# Patient Record
Sex: FEMALE | Race: WHITE | NOT HISPANIC OR LATINO | Employment: OTHER | RURAL
[De-identification: names, ages, dates, MRNs, and addresses within clinical notes are randomized per-mention and may not be internally consistent; named-entity substitution may affect disease eponyms.]

---

## 2019-09-06 ENCOUNTER — OFFICE VISIT CONVERTED (OUTPATIENT)
Dept: PULMONOLOGY | Facility: CLINIC | Age: 69
End: 2019-09-06
Attending: INTERNAL MEDICINE

## 2019-10-04 ENCOUNTER — OFFICE VISIT CONVERTED (OUTPATIENT)
Dept: PULMONOLOGY | Facility: CLINIC | Age: 69
End: 2019-10-04
Attending: INTERNAL MEDICINE

## 2020-02-21 ENCOUNTER — OFFICE VISIT CONVERTED (OUTPATIENT)
Dept: PULMONOLOGY | Facility: CLINIC | Age: 70
End: 2020-02-21
Attending: INTERNAL MEDICINE

## 2020-09-11 ENCOUNTER — OFFICE VISIT CONVERTED (OUTPATIENT)
Dept: PULMONOLOGY | Facility: CLINIC | Age: 70
End: 2020-09-11
Attending: INTERNAL MEDICINE

## 2021-05-28 VITALS
RESPIRATION RATE: 18 BRPM | SYSTOLIC BLOOD PRESSURE: 139 MMHG | HEIGHT: 65 IN | HEIGHT: 65 IN | DIASTOLIC BLOOD PRESSURE: 45 MMHG | WEIGHT: 234.37 LBS | HEIGHT: 65 IN | HEART RATE: 53 BPM | SYSTOLIC BLOOD PRESSURE: 142 MMHG | OXYGEN SATURATION: 95 % | BODY MASS INDEX: 39.4 KG/M2 | DIASTOLIC BLOOD PRESSURE: 74 MMHG | HEART RATE: 59 BPM | BODY MASS INDEX: 34.16 KG/M2 | WEIGHT: 205 LBS | RESPIRATION RATE: 16 BRPM | BODY MASS INDEX: 39.05 KG/M2 | OXYGEN SATURATION: 97 % | DIASTOLIC BLOOD PRESSURE: 52 MMHG | HEART RATE: 59 BPM | TEMPERATURE: 98.2 F | OXYGEN SATURATION: 92 % | TEMPERATURE: 98.1 F | RESPIRATION RATE: 16 BRPM | WEIGHT: 236.5 LBS | SYSTOLIC BLOOD PRESSURE: 126 MMHG

## 2021-05-28 VITALS
WEIGHT: 209.37 LBS | DIASTOLIC BLOOD PRESSURE: 46 MMHG | OXYGEN SATURATION: 93 % | BODY MASS INDEX: 38.53 KG/M2 | TEMPERATURE: 97.4 F | HEART RATE: 52 BPM | HEIGHT: 62 IN | RESPIRATION RATE: 14 BRPM | SYSTOLIC BLOOD PRESSURE: 146 MMHG

## 2021-05-28 NOTE — PROGRESS NOTES
Patient: LOUIE MORENO     Acct: [id number]     Report: #[id number]  UNIT #: [id number]     : 1950    Encounter Date:2020  PRIMARY CARE: TATIANA BATES  ***Signed***  --------------------------------------------------------------------------------------------------------------------  Chief Complaint      Encounter Date      Sep 11, 2020            Primary Care Provider      TATIANA BATES            Referring Provider            [facility]            Patient Complaint      Patient is complaining of      Pt here for f/u, pneumonia            VITALS      Height 5 ft 2 in / 157.48 cm      Weight 209 lbs 6 oz / 94.967940 kg      BSA 1.95 m2      BMI 38.3 kg/m2      Temperature 97.4 F / 36.33 C - Temporal      Pulse 52      Respirations 14      Blood Pressure 146/46 Sitting, Left Arm      Pulse Oximetry 93%, ROOM AIR      Initial Exhaled Nitrous Oxide      Date:  Sep 6, 2019            HPI      The patient is a 70 year old  mentally challenged adult female who lives in an     assisted living facility for mentally handicapped people. She wears supplemental    oxygen 2 liters at night. She used to be on a BiPAP machine but was not able to     cooperate with it secondary to her underlying disorder with her mental capacity     and handicap. She has been doing quite well and actually lost quite a bit of     weight over the past 6 months secondary to dietary changes and smaller portions.    She also is sleeping better now that she has stopped Seroquel, she was having     quite a bit of adverse reactions to that medication now that she has been off of    it she thinks a lot has improved and it has also contributed to her weight loss.    She has no complaints today and is here for routine follow up. She has had no     need for hospitalization and has not been hospitalized for pneumonia in the past    6 months, no antibiotics or steroids administered by her primary care provider.     She does take Brovana  and Pulmicort twice daily and DuoNeb approximately twice     daily to three times a day.            ROS      Constitutional:  Denies: Fatigue, Fever, Weight gain, Weight loss, Chills,     Insomnia, Other      Endocrine:  Denies: Polydipsia, Polyuria, Heat/cold intolerance, Abnorml     menstrual pattern, Diabetes, Other      Eyes:  Denies: Blurred vision, Vision Changes, Other      Ears, nose, mouth, throat:  Denies: Mouth lesions, Thrush, Throat pain,     Hoarseness, Allergies/Hay Fever, Post Nasal Drip, Headaches, Recent Head Injury,    Nose Bleeding, Neck Stiffness, Thyroid Mass, Hearing Loss, Ear Fullness, Dry     Mouth, Nasal or Sinus Pain, Dry Lips, Nasal discharge, Nasal congestion, Other      Cardiovascular:  Denies: Palpitations, Syncope, Claudication, Chest Pain, Wake     up Gasping for air, Leg Swelling, Irregular Heart Rate, Cyanosis, Dyspnea on     Exertion, Other      Gastrointestinal:  Denies: Nausea, Constipation, Diarrhea, Abdominal pain,     Vomiting, Difficulty Swallowing, Reflux/Heartburn, Dysphagia, Jaundice,     Bloating, Melena, Bloody stools, Other      Genitourinary:  Denies: Urinary frequency, Incontinence, Hematuria, Urgency,     Nocturia, Dysuria, Testicular problems, Other      Musculoskeletal:  Denies: Joint Pain, Joint Stiffness, Joint Swelling, Myalgias,    Other      Hematologic/lymphatic:  DENIES: Lymphadenopathy, Bruising, Bleeding tendencies,     Other      Neurological:  Denies: Headache, Numbness, Weakness, Seizures, Other      Psychiatric:  Denies: Anxiety, Appropriate Effect, Depression, Other      Sleep:  No: Excessive daytime sleep, Morning Headache?, Snoring, Insomnia?, Stop    breathing at sleep?, Other      Integumentary:  Denies: Rash, Dry skin, Skin Warm to Touch, Other      Immunologic/Allergic:  Denies: Latex allergy, Seasonal allergies, Asthma,     Urticaria, Eczema, Other      Immunization status:  No: Up to date            FAMILY/SOCIAL/MEDICAL HX      Surgical  History:  No: AAA Repair, Abdominal Surgery, Adenoids, Angioplasty,     Appendectomy, Back Surgery, Bladder Surgery, Bowel Surgery, Breast Surgery,     CABG, Carotid Stenosis, Cholecystectomy, Ear Surgery, Eye Surgery, Head Surgery,    Hernia Surgery, Kidney Surgery, Nose Surgery, Oral Surgery, Orthopedic Surgery,     Prostatectomy, Rectal Surgery, Spinal Surgery, Testicular Surgery, Throat Surge    ry, Tonsils, Valve Replacement, Vascular Surgery, Other Surgeries      Heart - Family Hx:  Mother      Diabetes - Family Hx:  Father, Brother, Sister      Cancer/Type - Family Hx:  Brother, Sister      Is Father Still Living?:  No      Is Mother Still Living?:  No      Social History:  No Tobacco Use, No Alcohol Use, No Recreational Drug use      Smoking status:  Never smoker      Hysterectomy:  Yes (Partial)      Anticoagulation Therapy:  No      Antibiotic Prophylaxis:  No      Medical History:  Yes: Anemia, Arthritis, Depression, Anxiety, GERD, High Blood     Pressure, Kidney or Bladder Disease, Reflux Disease, Thyroid Problem; No:     Chemotherapy/Cancer, Sinus Trouble      Psychiatric History      Anxiety and depression            PREVENTION      Hx Influenza Vaccination:  Yes      Date Influenza Vaccine Given:  Oct 1, 2008      Influenza Vaccine Declined:  Yes      2 or More Falls in Past Year?:  No      Fall Past Year with Injury?:  No      Hx Pneumococcal Vaccination:  No      Encouraged to follow-up with:  PCP regarding preventative exams.      Chart initiated by      Suzie pena MA            ALLERGIES/MEDICATIONS      Allergies:        Coded Allergies:             BUSPIRONE (Verified  Allergy, Intermediate, 9/11/20)           INFLUENZA VIRUS VACCINES (Verified  Allergy, Intermediate, 9/11/20)           OLANZAPINE (Verified  Allergy, Intermediate, 9/11/20)           PAROXETINE (Verified  Allergy, Intermediate, 9/11/20)           PHENTERMINE (Verified  Allergy, Intermediate, 9/11/20)           ZIPRASIDONE  (Verified  Allergy, Intermediate, 9/11/20)      Medications    Last Reconciled on 2/21/20 14:07 by CHELSIE DEAL,       Arformoterol Tartrate (Brovana) 15 Mcg/2 Ml Vial.neb      15 MCG INH RTQ12H, #60 NEB 4 Refills         Prov: CHELSIE DEAL         6/30/20       Neb-Budesonide (Pulmicort) 0.5 Mg/2 Ml Ampul.neb      0.5 MG INH BID, #60 NEB 3 Refills         Prov: CHELSIE DEAL         6/25/20       Nebulizer Accessories (Nebulizer Mask Adult) 1 Each Each      EACH XX ONCE, #1 0 Refills         Prov: CHELSIE DEAL         9/6/19       MUPIROCIN Oint (Bactroban 2% Oint) 22 Gm Oint...g.      1 APL TOPICAL TID, #1 TUBE         Reported         9/6/19       Zinc Oxide 20% Ointment (Zinc Oxide 20% Oint*) 28.35 Gm Oint...g.      1 APL TOPICAL BID, #1 TUBE         Reported         9/6/19       traZODone HCl (Desyrel) 50 Mg Tablet      100 MG PO HS, #60 TAB 0 Refills         Reported         9/6/19       Sertraline HCl (Sertraline*) 100 Mg Tablet      100 MG PO QDAY, #30 TAB 0 Refills         Reported         9/6/19       QUEtiapine (QUEtiapine) 200 Mg Tablet      400 MG PO QDAY for 30 Days, #60 TAB         Reported         9/6/19       Pantoprazole (Protonix) 40 Mg Tablet.dr      40 MG PO HS, #30 TAB 0 Refills         Reported         9/6/19       guaiFENesin LA (Mucinex) 600 Mg Tab.er.12h      600 MG PO BID, TAB         Reported         9/6/19       Metoprolol Tartrate (Metoprolol Tartrate) 25 Mg Tablet      25 MG PO BID, #60 TAB 0 Refills         Reported         9/6/19       Memantine Xr (Namenda XR) 28 Mg Cap.spr.24      28 MG PO QDAY, #30 CAP 0 Refills         Reported         9/6/19       Levothyroxine (Synthroid) 0.175 Mg      0.175 MG PO QDAY@07, #30 TAB 0 Refills         Reported         9/6/19       lamoTRIgine (lamoTRIgine) 25 Mg Tablet      25 MG PO BID, TAB         Reported         9/6/19       hydrOXYzine HCL (Atarax) 25 Mg Tablet      25 MG PO TID, #90 TAB 0 Refills         Reported         9/6/19        hydrALAZINE HCL (hydrALAZINE HCL) 25 Mg Tablet      25 MG PO BID, #60 TAB 0 Refills         Reported         9/6/19       Gabapentin (Gabapentin) 250 Mg/5 Ml Solution      300 MG PO HS, #180 ML 0 Refills         Reported         9/6/19       Richar-Fluticasone (Fluticasone 50 mcg) 16 Gm Spray.susp      2 PUFFS NARE EACH QDAY, #1 BOTTLE 0 Refills         Reported         9/6/19       Ferrous Sulfate (Ferrous Sulfate*) 325 Mg Tablet      325 MG PO QDAY, #30 TAB 0 Refills         Reported         9/6/19       Donepezil Hcl (Donepezil*) 10 Mg Tablet      10 MG PO HS, TAB         Reported         9/6/19       Docusate Sodium (Docusate Sodium) 50 Mg/5 Ml Liquid      100 MG PO HS, BOTTLE         Reported         9/6/19       Cyanocobalamin (Vitamin B-12) Inj (Cyanocobalamin Inj) 1,000 Mcg/1 Ml Vial      1000 MCG IM Q30D, VIAL         Reported         9/6/19       amLODIPine (amLODIPine) 5 Mg Tablet      10 MG PO QDAY, #60 TAB         Reported         9/6/19      Current Medications      Current Medications Reviewed 9/11/20            EXAM      VITAL SIGNS:  Reviewed.        NECK:  Supple without tracheal deviation or lymphadenopathy.  No thyromegaly     appreciated.      LYMPHATICS:  No cervical or supraclavicular lymphadenopathy.      HEENT: Pupils are equal, round and reactive to light. There is no scleral     icterus.  Nares patent without hypertrophy of the turbinates. No erythema of the    passages.  TMs are clear bilaterally with good cone of light. The posterior     pharynx is without  lesions or erythema.      RESPIRATORY: Poor aeration. Clear to auscultation bilaterally.  No wheezes,     rales or rhonchi.  Tympanic to percussion.        CARDIOVASCULAR:  Regular rate and rhythm.  No murmurs, gallops or rubs.  No     lower extremity edema.  Equal radial pulses.        GI: Soft, nontender, nondistended, no organomegaly.  Bowel sounds present in all    four quadrants. Obese.       MUSCULOSKELETAL:  No joint effusions,  erythema or warmth over the major joint     systems.      SKIN:  No rashes or lesions.      NEUROLOGIC: Cranial nerves II-XII are intact bilaterally.  Moves all     extremities. Ambulates with ease.      PSYCH:  Appropriate mood and affect.      Vtials      Vitals:             Height 5 ft 2 in / 157.48 cm           Weight 209 lbs 6 oz / 94.526828 kg           BSA 1.95 m2           BMI 38.3 kg/m2           Temperature 97.4 F / 36.33 C - Temporal           Pulse 52           Respirations 14           Blood Pressure 146/46 Sitting, Left Arm           Pulse Oximetry 93%, ROOM AIR            Assessment      Notes      Discontinued Medications      * NEB-Levalbuterol (LEVALBUTEROL HCL) 1.25 MG/3 ML VIAL.NEB: 1.25 MG INH RTTID       PRN SHORTNESS OF BREATH/WHEEZING #90         Instructions: DIAGNOSIS CODE REQUIRED PRIOR TO PRESCRIBING.      ASSESSMENT:      1.Chronic hypoxemic respiratory failure.       2. History of recurrent pneumonia secondary to aspiration into the airways.       3. Dysphagia on a mechanical soft and nectar thickened liquid diet.       4. Developmental delay with mental retardation and dementia.       5. Obesity with BMI 38.3.            PLAN:      1. I have continued to encourage ongoing weight loss and dietary changes.       2. I have made sure she has plenty of nebulizer refills on hand       3. Continue with supplemental oxygen at night.      4. Follow up in 1 year sooner if needed.            Patient Education      ACO BMI High above 25:  Counseling Given, Encouraged weight loss, Encourage     dietary changes      Education resources provided:  Yes            Electronically signed by CHELSIE DEAL  09/23/2020 17:08       Disclaimer: Converted document may not contain table formatting or lab diagrams. Please see [employer] for the authenticated document.

## 2021-05-28 NOTE — PROGRESS NOTES
Patient: LOUIE MORENO     Acct: [id number]     Report: #[id number]  UNIT #: [id number]     : 1950    Encounter Date:10/04/2019  PRIMARY CARE: TATIANA BATES  ***Signed***  --------------------------------------------------------------------------------------------------------------------  Chief Complaint      Encounter Date      Oct 4, 2019            Primary Care Provider      TATIANA BATES            Referring Provider            [facility]            Patient Complaint      Patient is complaining of      f/u, acute respiratory failure            VITALS      Height 5 ft 5 in / 165.1 cm      Weight 234 lbs 6 oz / 106.488257 kg      BSA 2.12 m2      BMI 39.0 kg/m2      Temperature 98.2 F / 36.78 C - Temporal      Pulse 59      Respirations 16      Blood Pressure 126/45 Sitting, Left Arm      Pulse Oximetry 97%, nasal cannula, 2 lpm      Initial Exhaled Nitrous Oxide      Date:  Sep 6, 2019      Exhaled Nitrous Oxide Results:  0 (could not do )            HPI      The patient is a 70 year old pleasant, but unfortunate 70 year old female with     developmental delay.  She resides at [facility]. She is back    today to follow up on respiratory failure. She has a history of both hypoxemic     and hypercapnic respiratory failure.  She was suppose to have had a home sleep     study prior to today's visit, but unfortunately those results could not be     obtained. She is with her caregiver and she states that the patient has been     having shortness of breath, wheezing and coughing, but this is all improved from    before, but ongoing. They have a BiPAP mask which the patient refuses to wear     secondary to claustrophobia anxiety. No fevers or chills.            ROS      Constitutional:  Denies: Fatigue, Fever, Weight gain, Weight loss, Chills,     Insomnia, Other      Respiratory/Breathing:  Complains of: Shortness of air, Wheezing, Cough; Denies:    Hemoptysis, Pleuritic pain,  Other      Endocrine:  Denies: Polydipsia, Polyuria, Heat/cold intolerance, Abnorml     menstrual pattern, Diabetes, Other      Eyes:  Denies: Blurred vision, Vision Changes, Other      Ears, nose, mouth, throat:  Denies: Mouth lesions, Thrush, Throat pain,     Hoarseness, Allergies/Hay Fever, Post Nasal Drip, Headaches, Recent Head Injury,    Nose Bleeding, Neck Stiffness, Thyroid Mass, Hearing Loss, Ear Fullness, Dry     Mouth, Nasal or Sinus Pain, Dry Lips, Nasal discharge, Nasal congestion, Other      Cardiovascular:  Denies: Palpitations, Syncope, Claudication, Chest Pain, Wake     up Gasping for air, Leg Swelling, Irregular Heart Rate, Cyanosis, Dyspnea on     Exertion, Other      Gastrointestinal:  Denies: Nausea, Constipation, Diarrhea, Abdominal pain,     Vomiting, Difficulty Swallowing, Reflux/Heartburn, Dysphagia, Jaundice,     Bloating, Melena, Bloody stools, Other      Genitourinary:  Denies: Urinary frequency, Incontinence, Hematuria, Urgency,     Nocturia, Dysuria, Testicular problems, Other      Musculoskeletal:  Denies: Joint Pain, Joint Stiffness, Joint Swelling, Myalgias,    Other      Hematologic/lymphatic:  DENIES: Lymphadenopathy, Bruising, Bleeding tendencies,     Other      Neurological:  Denies: Headache, Numbness, Weakness, Seizures, Other      Psychiatric:  Denies: Anxiety, Appropriate Effect, Depression, Other      Sleep:  No: Excessive daytime sleep, Morning Headache?, Snoring, Insomnia?, Stop    breathing at sleep?, Other      Integumentary:  Denies: Rash, Dry skin, Skin Warm to Touch, Other      Immunologic/Allergic:  Denies: Latex allergy, Seasonal allergies, Asthma,     Urticaria, Eczema, Other      Immunization status:  No: Up to date            FAMILY/SOCIAL/MEDICAL HX      Surgical History:  No: AAA Repair, Abdominal Surgery, Adenoids, Angioplasty,     Appendectomy, Back Surgery, Bladder Surgery, Bowel Surgery, Breast Surgery,     CABG, Carotid Stenosis, Cholecystectomy, Ear  Surgery, Eye Surgery, Head Surgery,    Hernia Surgery, Kidney Surgery, Nose Surgery, Oral Surgery, Orthopedic Surgery,     Prostatectomy, Rectal Surgery, Spinal Surgery, Testicular Surgery, Throat     Surgery, Tonsils, Valve Replacement, Vascular Surgery, Other Surgeries      Heart - Family Hx:  Mother      Diabetes - Family Hx:  Father, Brother, Sister      Cancer/Type - Family Hx:  Brother, Sister      Is Father Still Living?:  No      Is Mother Still Living?:  No      Social History:  No Tobacco Use, No Alcohol Use, No Recreational Drug use      Smoking status:  Never smoker      Hysterectomy:  Yes (Partial)      Anticoagulation Therapy:  No      Antibiotic Prophylaxis:  No      Medical History:  Yes: Allergies, Anemia, Arthritis, Atrial Fibrillation,     Depression, Anxiety, Forgetfullness, GERD, High Blood Pressure, High     Cholesterol, Kidney or Bladder Disease, Reflux Disease, Thyroid Problem; No:     Chemotherapy/Cancer      Psychiatric History      Anxiety and depression            PREVENTION      Hx Influenza Vaccination:  Yes      Date Influenza Vaccine Given:  Oct 1, 2008      Influenza Vaccine Declined:  Yes      2 or More Falls Past Year?:  No      Fall Past Year with Injury?:  No      Hx Pneumococcal Vaccination:  Yes      Encouraged to follow-up with:  PCP regarding preventative exams.      Chart initiated by      Suzie Flower MA            ALLERGIES/MEDICATIONS      Allergies:        Coded Allergies:             BUSPIRONE (Verified  Allergy, Intermediate, 10/4/19)           INFLUENZA VIRUS VACCINES (Verified  Allergy, Intermediate, 10/4/19)           OLANZAPINE (Verified  Allergy, Intermediate, 10/4/19)           PAROXETINE (Verified  Allergy, Intermediate, 10/4/19)           PHENTERMINE (Verified  Allergy, Intermediate, 10/4/19)           ZIPRASIDONE (Verified  Allergy, Intermediate, 10/4/19)      Medications    Last Reconciled on 9/6/19 11:29 by CHELSIE DEAL DO      Nebulizer Accessories  (Nebulizer Mask Adult) 1 Each Each      EACH XX ONCE, #1 0 Refills         Prov: CHELSIE DEAL         9/6/19       NEB-Levalbuterol (LEVALBUTEROL HCL) 1.25 Mg/3 Ml Vial.neb      1.25 MG INH RTTID PRN for SHORTNESS OF BREATH/WHEEZING, #90 NEB 4 Refills         Prov: CHELSIE DEAL         9/6/19       Neb-Budesonide (Pulmicort) 0.5 Mg/2 Ml Ampul.neb      0.5 MG INH BID, #60 NEB 4 Refills         Prov: CHELSIE DEAL         9/6/19       Arformoterol Tartrate (Brovana) 15 Mcg/2 Ml Vial.neb      15 MCG INH RTQ12H, #60 NEB 4 Refills         Prov: CHELSIE DEAL         9/6/19       MUPIROCIN Oint (Bactroban 2% Oint) 22 Gm Oint...g.      1 APL TOPICAL TID, #1 TUBE         Reported         9/6/19       Zinc Oxide 20% Ointment (Zinc Oxide 20% Oint*) 28.35 Gm Oint...g.      1 APL TOPICAL BID, #1 TUBE         Reported         9/6/19       traZODone HCl (Desyrel) 50 Mg Tablet      100 MG PO HS, #60 TAB 0 Refills         Reported         9/6/19       Sertraline HCl (Sertraline*) 100 Mg Tablet      100 MG PO QDAY, #30 TAB 0 Refills         Reported         9/6/19       QUEtiapine (QUEtiapine) 200 Mg Tablet      400 MG PO QDAY for 30 Days, #60 TAB         Reported         9/6/19       Pantoprazole (Protonix*) 40 Mg Tablet.dr      40 MG PO HS, #30 TAB 0 Refills         Reported         9/6/19       Guaifenesin (Mucinex*) 600 Mg Tab.er.12h      600 MG PO BID, TAB         Reported         9/6/19       Metoprolol Tartrate (Metoprolol Tartrate) 25 Mg Tablet      25 MG PO BID, #60 TAB 0 Refills         Reported         9/6/19       Memantine Xr (Namenda XR) 28 Mg Cap.spr.24      28 MG PO QDAY, #30 CAP 0 Refills         Reported         9/6/19       Levothyroxine (Synthroid) 0.175 Mg      0.175 MG PO QDAY@07, #30 TAB 0 Refills         Reported         9/6/19       NEB-Levalbuterol (LEVALBUTEROL HCL) 1.25 Mg/3 Ml Vial.neb      1.25 MG INH RTQ4H PRN for SHORTNESS OF BREATH, #180 NEB         Reported         9/6/19       lamoTRIgine  (lamoTRIgine*) 25 Mg Tablet      25 MG PO BID, TAB         Reported         9/6/19       hydrOXYzine HCL (Atarax) 25 Mg Tablet      25 MG PO TID, #90 TAB 0 Refills         Reported         9/6/19       hydrALAZINE HCl (hydrALAZINE HCl) 25 Mg Tablet      25 MG PO BID, #60 TAB 0 Refills         Reported         9/6/19       Gabapentin (Gabapentin) 250 Mg/5 Ml Solution      300 MG PO HS, #180 ML 0 Refills         Reported         9/6/19       Richar-Fluticasone (Fluticasone 50 mcg) 16 Gm Spray.susp      2 PUFFS NARE EACH QDAY, #1 BOTTLE 0 Refills         Reported         9/6/19       Ferrous Sulfate (Ferrous Sulfate*) 325 Mg Tablet      325 MG PO QDAY, #30 TAB 0 Refills         Reported         9/6/19       Donepezil Hcl (Donepezil*) 10 Mg Tablet      10 MG PO HS, TAB         Reported         9/6/19       Docusate Sodium (Docusate Sodium) 50 Mg/5 Ml Liquid      100 MG PO HS, BOTTLE         Reported         9/6/19       Cyanocobalamin (Vitamin B-12) Inj (Cyanocobalamin Inj) 1,000 Mcg/1 Ml Vial      1000 MCG IM Q30D, VIAL         Reported         9/6/19       amLODIPine (amLODIPine) 5 Mg Tablet      10 MG PO QDAY, #60 TAB         Reported         9/6/19      Current Medications      Current Medications Reviewed 10/4/19            EXAM      VITAL SIGNS:  Reviewed.        GENERAL: Appears developmentally delayed. She is wearing 2 liters of oxygen in     no acute distress.        NECK:  Supple without tracheal deviation or lymphadenopathy.  No thyromegaly     appreciated.      LYMPHATICS:  No cervical or supraclavicular lymphadenopathy.      HEENT: Pupils are equal, round and reactive to light. There is no scleral icte    juan pablo.  Nares patent without hypertrophy of the turbinates. No erythema of the     passages.  TMs are clear bilaterally with good cone of light. The posterior     pharynx is without  lesions or erythema.      RESPIRATORY:  Diminished at the bases without any adventitious lung sounds.        CARDIOVASCULAR:   Regular rate and rhythm.  No murmurs, gallops or rubs.  No     lower extremity edema.  Equal radial pulses.        GI: Soft, nontender, nondistended, no organomegaly.  Bowel sounds present in all    four quadrants.      MUSCULOSKELETAL:  No joint effusions, erythema or warmth over the major joint     systems.      SKIN:  No rashes or lesions.      NEUROLOGIC: Cranial nerves II-XII are intact bilaterally.  Moves all     extremities. Ambulates with ease.      PSYCH:  Appropriate mood and affect.      Vtials      Vitals:             Height 5 ft 5 in / 165.1 cm           Weight 234 lbs 6 oz / 106.750500 kg           BSA 2.12 m2           BMI 39.0 kg/m2           Temperature 98.2 F / 36.78 C - Temporal           Pulse 59           Respirations 16           Blood Pressure 126/45 Sitting, Left Arm           Pulse Oximetry 97%, nasal cannula, 2 lpm            REVIEW      Results Reviewed      PCCS Results Reviewed?:  Yes Prev Lab Results, Yes Prev Radiology Results, Yes     Previous Mecial Records      Lab Results      I reviewed her last clinic note.            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         Dx: Aspiration into lower respiratory tract - T17.800A      ASSESSMENT:            1.  Acute hypercapnic and hypoxemic respiratory failure in the setting of     sepsis.      2. Community acquired pneumonia, resolved.      3.  Ongoing dyspnea with exertion and chronic cough.      4. Developmental delay with mental retardation as well as dementia.      5. Concern for aspiration into the airways.      6. Dysphagia.      7. Concern for sleep apnea.            PLAN:      1.  We will resubmit for home sleep study. I have reached out to the RT case     manager in the office.       2.  I will refer the patient to gastroenterology for evaluation of difficulties     swallowing.  Potentially she will need a scope versus a modified barium swallow.      3. Continue current inhalers.            Patient Education      Education resources provided:  Yes      Time Spent:  > 50% /Coord Care            Electronically signed by CHELSIE DEAL  08/26/2020 10:55       Disclaimer: Converted document may not contain table formatting or lab diagrams. Please see [employer] for the authenticated document.

## 2021-05-28 NOTE — PROGRESS NOTES
Patient: LOUIE MORENO     Acct: [id number]     Report: #[id number]  UNIT #: [id number]     : 1950    Encounter Date:2019  PRIMARY CARE: TATIANA BATES  ***Signed***  --------------------------------------------------------------------------------------------------------------------  Chief Complaint      Encounter Date      Sep 6, 2019            Primary Care Provider      TATIANA BATES            Referring Provider            [facility]            Patient Complaint      Patient is complaining of      new patient/pneumonia            VITALS      Height 5 ft 5 in / 165.1 cm      Weight 236 lbs 8 oz / 107.325342 kg      BSA 2.12 m2      BMI 39.4 kg/m2      Pulse 59      Respirations 16      Blood Pressure 142/74 Sitting, Left Arm      Pulse Oximetry 95%, Nasal cannula, 2 lpm      Initial Exhaled Nitrous Oxide      Date:  Sep 6, 2019      Exhaled Nitrous Oxide Results:  0 (could not do )            HPI      The patient is an unfortunate 69 year old female with developmental delay that     lives in a residential home called [facility]. She was brought her today by    Albina her caregiver at the facility after recently being in the hospital for     pneumonia. The patient's baseline communication include yes and now answers, she    can say her name but does not have the concept of being able to take deep     breaths or use inhalers so she has to be coached through multiple routine tasks     including bathing herself. She was hospitalized at [facility] for     pneumonia as well as possible ARDS 19 - 08/10/19. She was sent home from     the hospital with a new oxygen requirement of 2 liters per minute for which she     does wear continuously. In reading the admission note from her hospitalization,     it appears that the patient was hypercapneic and confused secondary to CO2     narcosis. She was put on BiPAP which improved her confusion and lowered her CO2     level. She  also was diagnosed with sepsis secondary to pneumonia with no     organism isolate despite aggressive work up with multiple microbiology     specimens. She had diffuse airspace disease mostly on the right lung. Her     caregiver does not note any difficulty with the patient's swallowing that she is    aware of. She was discharged from the hospital on as needed Ventolin inhaler and    Symbicort 160 2 puffs twice daily but these were discontinued by the patient's     primary care provider Dr. Peace and switched her to Xopenex nebulizer three     times a day. The patient's caregiver states she is concerned about the patient     because when the patient is resting in her recliner watching TV she becomes very    somnolent and almost like she is not breathing. She will go over and check the     patient's oxygen saturation and they are in the low 80s despite having oxygen in    her nose. The patient has never had a formal polysomnogram and has not had a     follow up chest image to her knowledge since her discharge from the hospital.     The patient does not have the hand eye coordination to use the nebulizer and     requires a mask but the residential home has found it difficult to acquire a     face mask for the nebulizer machine. She notices that the patient's energy level    is at an all time low compared to where it used to be before her     hospitalization. She notices a cough but it is a mild weak cough.             I was unable to obtain a full 12 point Review of Systems secondary to the     patient's mental delay. Most of the questions were gained from her caregiver and    her interpretation of the patient's symptoms.              PAST MEDICAL HISTORY:      1.  Atrial fibrillation.      2. Dementia.       3. Hypothyroidism.       4.  Gastroesophageal reflux disease       5. Chronic kidney disease stage II/III.      6. Hypertension.       7. Hyperlipidemia.       8. Pneumonia.      9. Obesity.       10.  Developmental delay from childhood with mental retardation.       11. Anxiety.       12. History of ovarian cysts.       13. Depression with explosive disorder with disruptive behavior.        14. Constipation.       15. Chronic vitamin B12 deficiency.             PAST SURGICAL HISTORY:      1. Partial hysterectomy.       2. Colonoscopy.             FAMILY HISTORY:      Mother with a history of heart disease, father with diabetes, brothers and     sisters with diabetes and cancers the caregiver is unsure what type. The     patient's mother and father are no longer living.             SOCIAL HISTORY:      The patient lives in a residential home called [facility], her caregiver is    Albina. She is lifelong never smoker with no alcohol use or recreational drug     use.             Medications were reviewed and updated in the chart.            ROS      Constitutional:  Complains of: Fatigue      Respiratory/Breathing:  Complains of: Shortness of air (oxygen running in 84%     range at times with sleep/rest. O2 comes back up with talking or deep breaths),     Cough (recent diagnosis of pneumoni with a prolonged hospital stay), Other (on     supplemental oxygen, still desaturating into low 80's at times with sleep on     oxygen); Denies: Wheezing      Cardiovascular:  Complains of: Dyspnea on Exertion      Neurological:  Complains of: Weakness      Psychiatric:  Denies: Anxiety, Appropriate Effect, Depression, Other      Sleep:  Yes: Excessive daytime sleep, Stop breathing at sleep?      Immunologic/Allergic:  Denies: Latex allergy, Seasonal allergies, Asthma,     Urticaria, Eczema, Other            FAMILY/SOCIAL/MEDICAL HX      Surgical History:  No: AAA Repair, Abdominal Surgery, Adenoids, Angioplasty,     Appendectomy, Back Surgery, Bladder Surgery, Bowel Surgery, Breast Surgery,     CABG, Carotid Stenosis, Cholecystectomy, Ear Surgery, Eye Surgery, Head Surgery,    Hernia Surgery, Kidney Surgery, Nose Surgery,  Oral Surgery, Orthopedic Surgery,     Prostatectomy, Rectal Surgery, Spinal Surgery, Testicular Surgery, Throat     Surgery, Tonsils, Valve Replacement, Vascular Surgery, Other Surgeries      Heart - Family Hx:  Mother      Diabetes - Family Hx:  Father, Brother, Sister      Cancer/Type - Family Hx:  Brother, Sister      Is Father Still Living?:  No      Is Mother Still Living?:  No      Social History:  No Tobacco Use, No Alcohol Use, No Recreational Drug use      Smoking status:  Never smoker      Hysterectomy:  Yes (Partial)      Anticoagulation Therapy:  No      Antibiotic Prophylaxis:  No      Medical History:  Yes: Allergies, Anemia, Arthritis, Atrial Fibrillation,     Depression, Anxiety, Forgetfullness, GERD, High Blood Pressure, High     Cholesterol, Kidney or Bladder Disease, Reflux Disease, Thyroid Problem; No:     Chemotherapy/Cancer      Psychiatric History      Anxiety and depression            PREVENTION      Hx Influenza Vaccination:  Yes      Date Influenza Vaccine Given:  Oct 1, 2008      Influenza Vaccine Declined:  Yes      2 or More Falls Past Year?:  Yes      Fall Past Year with Injury?:  Yes      Hx Pneumococcal Vaccination:  No      Encouraged to follow-up with:  PCP regarding preventative exams.      Chart initiated by      sridevi pena ma            ALLERGIES/MEDICATIONS      Allergies:        Coded Allergies:             Buspar (Verified  Allergy, Intermediate, 9/6/19)           Fastin (Phentermine) (Verified  Allergy, Intermediate, 9/6/19)           Geodon (Verified  Allergy, Intermediate, 9/6/19)           Influenza Virus Vaccines (Verified  Allergy, Intermediate, 9/6/19)           Paxil (Verified  Allergy, Intermediate, 9/6/19)           Zyprexa (Verified  Allergy, Intermediate, 9/6/19)      Medications    Last Reconciled on 9/6/19 11:29 by CHELSIE DEAL DO      Nebulizer Accessories (Nebulizer Mask Adult) 1 Each Each      EACH XX ONCE, #1 0 Refills         Prov: CHELSIE DEAL          9/6/19       NEB-Levalbuterol (LEVALBUTEROL HCL) 1.25 Mg/3 Ml Vial.neb      1.25 MG INH RTTID PRN for SHORTNESS OF BREATH/WHEEZING, #90 NEB 4 Refills         Prov: CHELSIE DEAL         9/6/19       Neb-Budesonide (Pulmicort) 0.5 Mg/2 Ml Ampul.neb      0.5 MG INH BID, #60 NEB 4 Refills         Prov: CHELSIE DEAL         9/6/19       Arformoterol Tartrate (Brovana) 15 Mcg/2 Ml Vial.neb      15 MCG INH RTQ12H, #60 NEB 4 Refills         Prov: CHELSIE DEAL         9/6/19       MUPIROCIN Oint (Bactroban 2% Oint) 22 Gm Oint...g.      1 APL TOPICAL TID, #1 TUBE         Reported         9/6/19       Zinc Oxide 20% Ointment (Zinc Oxide 20% Oint*) 28.35 Gm Oint...g.      1 APL TOPICAL BID, #1 TUBE         Reported         9/6/19       traZODone HCl (Desyrel) 50 Mg Tablet      100 MG PO HS, #60 TAB 0 Refills         Reported         9/6/19       Sertraline HCl (Sertraline*) 100 Mg Tablet      100 MG PO QDAY, #30 TAB 0 Refills         Reported         9/6/19       QUEtiapine (QUEtiapine) 200 Mg Tablet      400 MG PO QDAY for 30 Days, #60 TAB         Reported         9/6/19       Pantoprazole (Protonix*) 40 Mg Tablet.dr      40 MG PO HS, #30 TAB 0 Refills         Reported         9/6/19       Guaifenesin (Mucinex*) 600 Mg Tab.er.12h      600 MG PO BID, TAB         Reported         9/6/19       Metoprolol Tartrate (Metoprolol Tartrate) 25 Mg Tablet      25 MG PO BID, #60 TAB 0 Refills         Reported         9/6/19       Memantine Xr (Namenda XR) 28 Mg Cap.spr.24      28 MG PO QDAY, #30 CAP 0 Refills         Reported         9/6/19       Levothyroxine (Synthroid) 0.175 Mg      0.175 MG PO QDAY@07, #30 TAB 0 Refills         Reported         9/6/19       NEB-Levalbuterol (LEVALBUTEROL HCL) 1.25 Mg/3 Ml Vial.neb      1.25 MG INH RTQ4H PRN for SHORTNESS OF BREATH, #180 NEB         Reported         9/6/19       lamoTRIgine (lamoTRIgine*) 25 Mg Tablet      25 MG PO BID, TAB         Reported         9/6/19       hydrOXYzine HCL (Atarax)  25 Mg Tablet      25 MG PO TID, #90 TAB 0 Refills         Reported         9/6/19       hydrALAZINE HCl (hydrALAZINE HCl) 25 Mg Tablet      25 MG PO BID, #60 TAB 0 Refills         Reported         9/6/19       Gabapentin (Gabapentin) 250 Mg/5 Ml Solution      300 MG PO HS, #180 ML 0 Refills         Reported         9/6/19       Richar-Fluticasone (Fluticasone 50 mcg) 16 Gm Spray.susp      2 PUFFS NARE EACH QDAY, #1 BOTTLE 0 Refills         Reported         9/6/19       Ferrous Sulfate (Ferrous Sulfate*) 325 Mg Tablet      325 MG PO QDAY, #30 TAB 0 Refills         Reported         9/6/19       Donepezil Hcl (Donepezil*) 10 Mg Tablet      10 MG PO HS, TAB         Reported         9/6/19       Docusate Sodium (Docusate Sodium) 50 Mg/5 Ml Liquid      100 MG PO HS, BOTTLE         Reported         9/6/19       Cyanocobalamin (Vitamin B-12) Inj (Cyanocobalamin Inj) 1,000 Mcg/1 Ml Vial      1000 MCG IM Q30D, VIAL         Reported         9/6/19       amLODIPine (amLODIPine) 5 Mg Tablet      10 MG PO QDAY, #60 TAB         Reported         9/6/19      Current Medications      Current Medications Reviewed 9/6/19            EXAM      VITAL SIGNS:  Reviewed.        GENERAL: The patient appears older than her stated age, she is obese, resting in    the exam chair on 2 liters per minute oxygen.       NECK:  Supple without tracheal deviation or lymphadenopathy.  No thyromegaly     appreciated. Thick neck with increased circumference.       LYMPHATICS:  No cervical or supraclavicular lymphadenopathy.      HEENT: Pupils are equal, round and reactive to light. There is no scleral ic    terus.  Nares patent without hypertrophy of the turbinates. No erythema of the     passages. The posterior pharynx is very small with Mallampati III-VI.      RESPIRATORY: Very poor air movement at the based bilaterally.  No wheezes, rales    or rhonchi.  Tympanic to percussion.  This exam is limited due to the patient's     ability to comply with the exam  on deep inspiration.       CARDIOVASCULAR:  Regular rate and rhythm.  No murmurs, gallops or rubs.  No     lower extremity edema.  Equal radial pulses.        GI: Soft, nontender, nondistended, no organomegaly.  Bowel sounds present in all    four quadrants. Truncal obesity, large pannus.       MUSCULOSKELETAL:  No joint effusions, erythema or warmth over the major joint     systems.      SKIN:  No rashes or lesions.      NEUROLOGIC: Cranial nerves II-XII are intact bilaterally.  Moves all     extremities. Ambulates with ease.      PSYCH:  Appropriate mood and affect.      Vtials      Vitals:             Height 5 ft 5 in / 165.1 cm           Weight 236 lbs 8 oz / 107.635216 kg           BSA 2.12 m2           BMI 39.4 kg/m2           Pulse 59           Respirations 16           Blood Pressure 142/74 Sitting, Left Arm           Pulse Oximetry 95%, Nasal cannula, 2 lpm            REVIEW      Results Reviewed      PCCS Results Reviewed?:  Yes Prev Lab Results, Yes Prev Radiology Results, Yes     Previous [facility] Records (I personally reviewed admission notes from [facility]. I was not able to find the discharge summary and we have requested     that today. )      Lab Results      I personally reviewed arterial blood gas obtained 07/26/19 showing a pH of 7.29,    PCO2 52, PAO2 56, bicarb 25, oxygen saturation of 85%.  I reviewed CO2 level on     BMP which was elevated at 27. Hemoglobin A1C 5.8%. Creatinine 1.79, mildly     elevated AST/ALT of 30  and 38 respectively with alkaline phosphatase of 126.      NT-proBNP 1056 which is elevated for her age. I reviewed critical results     obtained while she was in the hospital. This was on document page 53 of 124 of     our scanned documents. Her PCO2 highest level recorded on 08/01/19 was 58.      Radiographic Results      I personally reviewed the interpretation of the chest x-ray which showed diffuse    airspace disease over the right lung although I have not been  able to visualize     this myself. I have requested any CT scans of the chest that may be available.      PFT Results      There are no pulmonary function test for my review and quite honestly I do not     think the patient would be able to comply with the instructions and carry out     this procedure.            Assessment      Sepsis with acute hypercapnic respiratory failure without septic shock - A41.9,     R65.20, J96.02            Notes      New Medications      * amLODIPine 5 MG TABLET: 10 MG PO QDAY #60      * Cyanocobalamin (Vitamin B-12) Inj (Cyanocobalamin Inj) 1,000 MCG/1 ML VIAL:       1,000 MCG IM Q30D      * Docusate Sodium 50 MG/5 ML LIQUID: 100 MG PO HS      * Donepezil Hcl (Donepezil*) 10 MG TABLET: 10 MG PO HS      * Ferrous Sulfate (Ferrous Sulfate*) 325 MG TABLET: 325 MG PO QDAY #30      * Richar-Fluticasone (Fluticasone 50 mcg) 16 GM SPRAY.SUSP: 2 PUFFS NARE EACH QDAY       #1      * Gabapentin 250 MG/5 ML SOLUTION: 300 MG PO HS #180      * hydrALAZINE HCl 25 MG TABLET: 25 MG PO BID #60      * hydrOXYzine HCL (Atarax) 25 MG TABLET: 25 MG PO TID #90      * lamoTRIgine (lamoTRIgine*) 25 MG TABLET: 25 MG PO BID      * NEB-Levalbuterol (LEVALBUTEROL HCL) 1.25 MG/3 ML VIAL.NEB: 1.25 MG INH RTQ4H       PRN SHORTNESS OF BREATH #180         Instructions: DIAGNOSIS CODE REQUIRED PRIOR TO PRESCRIBING.      * Levothyroxine (Synthroid) 0.175 M.175 MG PO QDAY@07 #30      * MEMANTINE XR (Namenda XR) 28 MG CAP.SPR.24: 28 MG PO QDAY #30      * Metoprolol Tartrate 25 MG TABLET: 25 MG PO BID #60      * Guaifenesin (Mucinex*) 600 MG TAB.ER.12H: 600 MG PO BID      * PANTOPRAZOLE (Protonix*) 40 MG TABLET.DR: 40 MG PO HS #30         Instructions: Take on an empty stomach.      * QUEtiapine 200 MG TABLET: 400 MG PO QDAY 30 Days #60      * Sertraline HCl (Sertraline*) 100 MG TABLET: 100 MG PO QDAY #30      * traZODone HCl (Desyrel) 50 MG TABLET: 100 MG PO HS #60      * Zinc Oxide 20% Ointment (Zinc Oxide 20% Oint*)  28.35 GM OINT...G.: 1 APL       TOPICAL BID #1      * MUPIROCIN Oint (Bactroban 2% Oint) 22 GM OINT...G.: 1 APL TOPICAL TID #1      * ARFORMOTEROL TARTRATE (Brovana) 15 MCG/2 ML VIAL.NEB: 15 MCG INH RTQ12H #60         Instructions: DIAGNOSIS CODE REQUIRED PRIOR TO PRESCRIBING.      * Neb-Budesonide (Pulmicort) 0.5 MG/2 ML AMPUL.NEB: 0.5 MG INH BID #60      * NEB-Levalbuterol (LEVALBUTEROL HCL) 1.25 MG/3 ML VIAL.NEB: 1.25 MG INH RTTID       PRN SHORTNESS OF BREATH/WHEEZING #90         Instructions: DIAGNOSIS CODE REQUIRED PRIOR TO PRESCRIBING.      * NEBULIZER ACCESSORIES (Nebulizer Mask Adult) 1 EACH EACH: EACH XX ONCE #1         Instructions: Use as directed to administer nebulized medications.      New Diagnostics      * Home Sleep Study, As Soon As Possible         Dx: Sepsis with acute hypercapnic respiratory failure without septic shock -        A41.9, R65.20, J96.02      * Chest 2 View, Routine         Dx: Pneumonia - J18.9      New Office Procedures      * Follow Up Appointment, Month      ASSESSMENT:      1. Recent prolonged hospitalization for community acquired pneumonia.       2. Sepsis with acute hypercapnic respiratory failure without shock.       3. Obesity BMI 39.4, concern for obesity hyperventilation syndrome.      4. Dyspnea on exertion.       5. Acute hypoxemic respiratory failure now on supplemental oxygen.      6.  Developmental delay and mental retardation with dementia.       7. Possible aspiration into the airways.       8.  Hypertension.       9. Hypothyroidism.       10. Anxiety and depression with aggressive behavior on multiple QT prolonging     medications.       11. Inability to perform hand eye coordinated events secondary to developmental     delay.       12. Concern for sleep apnea based on body habitus.             PLAN:       1. I spent a great deal of time today speaking the patient's caregiver about     some of my concerns which include the inability to treat her the way I would      like to treat her because she is unable to wear a CPAP mask at night because of     her fear and childlike behavior. I am afraid she will rip it off and not comply     with it. Therefore we will try to get a nasal pillow form of a noninvasive     positive pressure ventilator like trilogy approved based on her most recent     hypercapneia noted in this chart from her hospitalization.       2. I ordered a home  sleep study in the event that we do need to have a     documented polysomnogram showing obstruction to airflow at night.       3. I will order a 2 view CT scan of the chest to follow up on her most recent     airspace disease on the right side to ensure this is cleared.       4. If she continues to have recurrent pneumonia events or hospitalizations she     should definitely undergo a modified barium swallow and speech therapy     evaluation for possible aspiration into the airways.       5. She is on several sedating medications and I am concerned about this as well     given her hypercapneia but her caregiver states these are a must.       6. In regards to her recent pneumonia, hypercapneia respiratory failure and     shortness of breath and cough, I will expand her nebulizer treatments to Brovana    and Pulmicort twice daily every day. We will provide a mask to use with the     nebulizer machine. We will change the levabulterol to three times a day as     needed for shortness of breath, wheezing or coughing.             I will follow up with her in 1 month to go over the above studies and see how     she is doing with her new nebulizer treatments.            Patient Education      ACO BMI High above 25:  Encouraged weight loss      Education resources provided:  Yes      Patient Education Provided:  COPD (discussed unlikely diagnosis more likely     obesity hypoventilation), How to use a Nebulizer (the mask and taughte     appropraite technique), Sleep Apnea (discussed home vs in lab sleep study)       Time Spent:  > 50% /Coord Care                 Disclaimer: Converted document may not contain table formatting or lab diagrams. Please see [employer] for the authenticated document.

## 2021-05-28 NOTE — PROGRESS NOTES
Patient: LOUIE MORENO     Acct: [id number]     Report: #[id number]  UNIT #: [id number]     : 1950    Encounter Date:2020  PRIMARY CARE: TATIANA BATES  ***Signed***  --------------------------------------------------------------------------------------------------------------------  Chief Complaint      Encounter Date      2020            Primary Care Provider      TATIANA BATES            Referring Provider            [facility]            Patient Complaint      Patient is complaining of      pt here for 02 recert, pneumonia            VITALS      Height 5 ft 5.00 in / 165.1 cm      Weight 205 lbs  / 92.421799 kg      BSA 2.00 m2      BMI 34.1 kg/m2      Temperature 98.1 F / 36.72 C - Oral      Pulse 53      Respirations 18      Blood Pressure 139/52 Sitting, Left Arm      Pulse Oximetry 92%, Room air      Comment: resting room air 92%      Ambulation room air 91%      Initial Exhaled Nitrous Oxide      Date:  Sep 6, 2019      Exhaled Nitrous Oxide Results:  0 (could not do )            HPI      The patient is a 70 year old female here for oxygen recertification. She has a     history of developmental delay, dysphagia with aspiration into the airways and     recurrent pneumonia. She is on a speech modified diet with mechanical soft and     nectar thickened liquids.             Since she last saw me she has had no hospitalizations or urgent care visits. She    has been wearing her supplemental oxygen throughout the day and night. She     refuses to use the trilogy machine because she will not keep it on her face so     they will be returning it to the DME company. She is compliant with Brovana and     Pulmicort twice daily and this does seem to help with her breathing. Her     caregiver is here with her today and states the patient has been doing quite     well over the past 3 months with no concerns on her part.            ROS      Constitutional:  Complains of: Fatigue; Denies:  Fever, Weight gain, Weight loss,    Chills, Insomnia, Other      Respiratory/Breathing:  Denies: Shortness of air, Wheezing, Cough, Hemoptysis,     Pleuritic pain, Other      Endocrine:  Denies: Polydipsia, Polyuria, Heat/cold intolerance, Abnorml     menstrual pattern, Diabetes, Other      Eyes:  Denies: Blurred vision, Vision Changes, Other      Ears, nose, mouth, throat:  Denies: Mouth lesions, Thrush, Throat pain,     Hoarseness, Allergies/Hay Fever, Post Nasal Drip, Headaches, Recent Head Injury,    Nose Bleeding, Neck Stiffness, Thyroid Mass, Hearing Loss, Ear Fullness, Dry     Mouth, Nasal or Sinus Pain, Dry Lips, Nasal discharge, Nasal congestion, Other      Cardiovascular:  Denies: Palpitations, Syncope, Claudication, Chest Pain, Wake     up Gasping for air, Leg Swelling, Irregular Heart Rate, Cyanosis, Dyspnea on     Exertion, Other      Gastrointestinal:  Denies: Nausea, Constipation, Diarrhea, Abdominal pain,     Vomiting, Difficulty Swallowing, Reflux/Heartburn, Dysphagia, Jaundice,     Bloating, Melena, Bloody stools, Other      Genitourinary:  Denies: Urinary frequency, Incontinence, Hematuria, Urgency, N    octuria, Dysuria, Testicular problems, Other      Musculoskeletal:  Denies: Joint Pain, Joint Stiffness, Joint Swelling, Myalgias,    Other      Hematologic/lymphatic:  DENIES: Lymphadenopathy, Bruising, Bleeding tendencies,     Other      Neurological:  Denies: Headache, Numbness, Weakness, Seizures, Other      Psychiatric:  Denies: Anxiety, Appropriate Effect, Depression, Other      Sleep:  No: Excessive daytime sleep, Morning Headache?, Snoring, Insomnia?, Stop    breathing at sleep?, Other      Integumentary:  Denies: Rash, Dry skin, Skin Warm to Touch, Other      Immunologic/Allergic:  Denies: Latex allergy, Seasonal allergies, Asthma,     Urticaria, Eczema, Other      Immunization status:  No: Up to date            FAMILY/SOCIAL/MEDICAL HX      Surgical History:  No: AAA Repair, Abdominal  Surgery, Adenoids, Angioplasty,     Appendectomy, Back Surgery, Bladder Surgery, Bowel Surgery, Breast Surgery,     CABG, Carotid Stenosis, Cholecystectomy, Ear Surgery, Eye Surgery, Head Surgery,    Hernia Surgery, Kidney Surgery, Nose Surgery, Oral Surgery, Orthopedic Surgery,     Prostatectomy, Rectal Surgery, Spinal Surgery, Testicular Surgery, Throat     Surgery, Tonsils, Valve Replacement, Vascular Surgery, Other Surgeries      Heart - Family Hx:  Mother      Diabetes - Family Hx:  Father, Brother, Sister      Cancer/Type - Family Hx:  Brother, Sister      Is Father Still Living?:  No      Is Mother Still Living?:  No      Social History:  No Tobacco Use, No Alcohol Use, No Recreational Drug use      Smoking status:  Never smoker      Hysterectomy:  Yes (Partial)      Anticoagulation Therapy:  No      Antibiotic Prophylaxis:  No      Medical History:  Yes: Allergies, Anemia, Arthritis, Atrial Fibrillation,     Depression, Anxiety, Forgetfullness, GERD, High Blood Pressure, High     Cholesterol, Kidney or Bladder Disease, Reflux Disease, Thyroid Problem; No:     Chemotherapy/Cancer      Psychiatric History      Anxiety and depression            PREVENTION      Hx Influenza Vaccination:  Yes      Date Influenza Vaccine Given:  Oct 1, 2008      Influenza Vaccine Declined:  Yes      2 or More Falls Past Year?:  No      Fall Past Year with Injury?:  No      Hx Pneumococcal Vaccination:  No      Encouraged to follow-up with:  PCP regarding preventative exams.      Chart initiated by      Suzie Flower MA            ALLERGIES/MEDICATIONS      Allergies:        Coded Allergies:             BUSPIRONE (Verified  Allergy, Intermediate, 2/21/20)           INFLUENZA VIRUS VACCINES (Verified  Allergy, Intermediate, 2/21/20)           OLANZAPINE (Verified  Allergy, Intermediate, 2/21/20)           PAROXETINE (Verified  Allergy, Intermediate, 2/21/20)           PHENTERMINE (Verified  Allergy, Intermediate, 2/21/20)            ZIPRASIDONE (Verified  Allergy, Intermediate, 2/21/20)      Medications    Last Reconciled on 2/21/20 14:07 by CHELSIE DEAL,       Arformoterol Tartrate (Brovana) 15 Mcg/2 Ml Vial.neb      15 MCG INH RTQ12H, #60 NEB 4 Refills         Prov: CHELSIE DEAL         1/27/20       Neb-Budesonide (Pulmicort) 0.5 Mg/2 Ml Ampul.neb      0.5 MG INH BID, #60 NEB 4 Refills         Prov: CHELSIE DEAL         1/23/20       Nebulizer Accessories (Nebulizer Mask Adult) 1 Each Each      EACH XX ONCE, #1 0 Refills         Prov: CHELSIE DEAL         9/6/19       NEB-Levalbuterol (LEVALBUTEROL HCL) 1.25 Mg/3 Ml Vial.neb      1.25 MG INH RTTID PRN for SHORTNESS OF BREATH/WHEEZING, #90 NEB 4 Refills         Prov: CHELSIE DEAL         9/6/19       MUPIROCIN Oint (Bactroban 2% Oint) 22 Gm Oint...g.      1 APL TOPICAL TID, #1 TUBE         Reported         9/6/19       Zinc Oxide 20% Ointment (Zinc Oxide 20% Oint*) 28.35 Gm Oint...g.      1 APL TOPICAL BID, #1 TUBE         Reported         9/6/19       traZODone HCl (Desyrel) 50 Mg Tablet      100 MG PO HS, #60 TAB 0 Refills         Reported         9/6/19       Sertraline HCl (Sertraline*) 100 Mg Tablet      100 MG PO QDAY, #30 TAB 0 Refills         Reported         9/6/19       QUEtiapine (QUEtiapine) 200 Mg Tablet      400 MG PO QDAY for 30 Days, #60 TAB         Reported         9/6/19       Pantoprazole (Protonix) 40 Mg Tablet.dr      40 MG PO HS, #30 TAB 0 Refills         Reported         9/6/19       guaiFENesin LA (Mucinex) 600 Mg Tab.er.12h      600 MG PO BID, TAB         Reported         9/6/19       Metoprolol Tartrate (Metoprolol Tartrate) 25 Mg Tablet      25 MG PO BID, #60 TAB 0 Refills         Reported         9/6/19       Memantine Xr (Namenda XR) 28 Mg Cap.spr.24      28 MG PO QDAY, #30 CAP 0 Refills         Reported         9/6/19       Levothyroxine (Synthroid) 0.175 Mg      0.175 MG PO QDAY@07, #30 TAB 0 Refills         Reported         9/6/19       NEB-Levalbuterol  (LEVALBUTEROL HCL) 1.25 Mg/3 Ml Vial.neb      1.25 MG INH RTQ4H PRN for SHORTNESS OF BREATH, #180 NEB         Reported         9/6/19       lamoTRIgine (lamoTRIgine) 25 Mg Tablet      25 MG PO BID, TAB         Reported         9/6/19       hydrOXYzine HCL (Atarax) 25 Mg Tablet      25 MG PO TID, #90 TAB 0 Refills         Reported         9/6/19       hydrALAZINE HCL (hydrALAZINE HCL) 25 Mg Tablet      25 MG PO BID, #60 TAB 0 Refills         Reported         9/6/19       Gabapentin (Gabapentin) 250 Mg/5 Ml Solution      300 MG PO HS, #180 ML 0 Refills         Reported         9/6/19       Richar-Fluticasone (Fluticasone 50 mcg) 16 Gm Spray.susp      2 PUFFS NARE EACH QDAY, #1 BOTTLE 0 Refills         Reported         9/6/19       Ferrous Sulfate (Ferrous Sulfate*) 325 Mg Tablet      325 MG PO QDAY, #30 TAB 0 Refills         Reported         9/6/19       Donepezil Hcl (Donepezil*) 10 Mg Tablet      10 MG PO HS, TAB         Reported         9/6/19       Docusate Sodium (Docusate Sodium) 50 Mg/5 Ml Liquid      100 MG PO HS, BOTTLE         Reported         9/6/19       Cyanocobalamin (Vitamin B-12) Inj (Cyanocobalamin Inj) 1,000 Mcg/1 Ml Vial      1000 MCG IM Q30D, VIAL         Reported         9/6/19       amLODIPine (amLODIPine) 5 Mg Tablet      10 MG PO QDAY, #60 TAB         Reported         9/6/19      Current Medications      Current Medications Reviewed 2/21/20            EXAM      VITAL SIGNS:  Reviewed.        GENERAL: Flat affect. The patient was non verbal today.       NECK:  Supple without tracheal deviation or lymphadenopathy.  No thyromegaly     appreciated.      LYMPHATICS:  No cervical or supraclavicular lymphadenopathy.      HEENT: Pupils are equal, round and reactive to light. There is no scleral     icterus.  Nares patent without hypertrophy of the turbinates. No erythema of the    passages.  TMs are clear bilaterally with good cone of light. The posterior     pharynx is without  lesions or erythema.       RESPIRATORY:  Clear to auscultation bilaterally.  No wheezes, rales or rhonchi.     Tympanic to percussion.        CARDIOVASCULAR:  Regular rate and rhythm.  No murmurs, gallops or rubs.  No     lower extremity edema.  Equal radial pulses.        GI: Soft, nontender, nondistended, no organomegaly.  Bowel sounds present in all    four quadrants.      MUSCULOSKELETAL:  No joint effusions, erythema or warmth over the major joint     systems.      SKIN:  No rashes or lesions.      NEUROLOGIC: Cranial nerves II-XII are intact bilaterally.  Moves all     extremities. Ambulates with ease.      PSYCH:  Appropriate mood and affect.      Vtials      Vitals:             Height 5 ft 5.00 in / 165.1 cm           Weight 205 lbs  / 92.034921 kg           BSA 2.00 m2           BMI 34.1 kg/m2           Temperature 98.1 F / 36.72 C - Oral           Pulse 53           Respirations 18           Blood Pressure 139/52 Sitting, Left Arm           Pulse Oximetry 92%, Room air           Comment: resting room air 92%      Ambulation room air 91%            REVIEW      Results Reviewed      PsychiatricS Results Reviewed?:  Yes Previous [facility] Records      Lab Results      I personally reviewed the patient's 6 minute walk test performed in the office     today. The lowest her oxygen dropped was 91%.            Assessment      Hypoxia - R09.02            Notes      New Diagnostics      * Overnight Oximetry, Routine         Dx: Hypoxia - R09.02      New Office Procedures      * Follow Up Appointment, 6 Months         Dx: Hypoxia - R09.02      ASSESSMENT:      1.  History of hypoxic respiratory failure.       2. History of recurrent pneumonia secondary to aspiration into the airways.       3. Dysphagia on a mechanical soft and nectar thickened liquid diet.       4. Developmental delay with mental retardation and dementia.       5. Obesity with BMI 34.1.            PLAN:       1. The patient did not qualify for supplemental oxygen based on her 6 minute      walk test. She can stop using oxygen during the day with activities. I am still     concerned that the patient may need oxygen at night so I have ordered an     overnight oximetry study on room air.       2.  I would like to see her back in 6 months. We will call her with the results     of her overnight oximetry.       3. The patient will return her trilogy machine to the [employer] as she has not    been compliant with it and wearing it. A lot of this has to do with her     developmental delay.       4. Continue Brovana and Pulmicort every day twice daily and as needed levalbuter    ol as needed.       5. Vaccinations are up to date.       6. Follow up in 6 months.            Patient Education      Education resources provided:  Yes      Patient Education Provided:  Acute Asthma, COPD            Electronically signed by CHELSIE DEAL  03/06/2020 10:48       Disclaimer: Converted document may not contain table formatting or lab diagrams. Please see [employer] for the authenticated document.

## 2021-06-22 RX ORDER — ARFORMOTEROL TARTRATE 15 UG/2ML
15 SOLUTION RESPIRATORY (INHALATION)
Qty: 120 ML | Refills: 5 | Status: SHIPPED | OUTPATIENT
Start: 2021-06-22 | End: 2021-11-10 | Stop reason: SDUPTHER

## 2021-11-10 ENCOUNTER — OFFICE VISIT (OUTPATIENT)
Dept: PULMONOLOGY | Facility: CLINIC | Age: 71
End: 2021-11-10

## 2021-11-10 VITALS
OXYGEN SATURATION: 94 % | RESPIRATION RATE: 16 BRPM | HEART RATE: 55 BPM | BODY MASS INDEX: 36.02 KG/M2 | TEMPERATURE: 97.8 F | HEIGHT: 64 IN | WEIGHT: 211 LBS | SYSTOLIC BLOOD PRESSURE: 143 MMHG | DIASTOLIC BLOOD PRESSURE: 52 MMHG

## 2021-11-10 DIAGNOSIS — J18.9 RECURRENT PNEUMONIA: ICD-10-CM

## 2021-11-10 DIAGNOSIS — R05.9 COUGH: ICD-10-CM

## 2021-11-10 DIAGNOSIS — J96.11 CHRONIC RESPIRATORY FAILURE WITH HYPOXIA (HCC): Primary | ICD-10-CM

## 2021-11-10 DIAGNOSIS — G47.34 NOCTURNAL HYPOXIA: ICD-10-CM

## 2021-11-10 DIAGNOSIS — R62.50 DEVELOPMENTAL DELAY: ICD-10-CM

## 2021-11-10 DIAGNOSIS — R13.10 DYSPHAGIA, UNSPECIFIED TYPE: ICD-10-CM

## 2021-11-10 PROCEDURE — 99214 OFFICE O/P EST MOD 30 MIN: CPT | Performed by: NURSE PRACTITIONER

## 2021-11-10 RX ORDER — HYDRALAZINE HYDROCHLORIDE 50 MG/1
50 TABLET, FILM COATED ORAL 3 TIMES DAILY
COMMUNITY
Start: 2021-10-18

## 2021-11-10 RX ORDER — DONEPEZIL HYDROCHLORIDE 10 MG/1
10 TABLET, FILM COATED ORAL NIGHTLY
COMMUNITY
Start: 2021-10-18

## 2021-11-10 RX ORDER — LAMOTRIGINE 25 MG/1
75 TABLET ORAL 2 TIMES DAILY
COMMUNITY
Start: 2021-10-18

## 2021-11-10 RX ORDER — ARFORMOTEROL TARTRATE 15 UG/2ML
15 SOLUTION RESPIRATORY (INHALATION)
Qty: 120 ML | Refills: 12 | Status: SHIPPED | OUTPATIENT
Start: 2021-11-10

## 2021-11-10 RX ORDER — AMLODIPINE BESYLATE 10 MG/1
10 TABLET ORAL DAILY
COMMUNITY
Start: 2021-10-18

## 2021-11-10 RX ORDER — DOCUSATE SODIUM 100 MG/1
100 CAPSULE, LIQUID FILLED ORAL 2 TIMES DAILY
COMMUNITY
Start: 2021-10-18

## 2021-11-10 RX ORDER — BUDESONIDE 0.5 MG/2ML
INHALANT ORAL
COMMUNITY
Start: 2021-10-29 | End: 2021-11-10 | Stop reason: SDUPTHER

## 2021-11-10 RX ORDER — LEVOTHYROXINE SODIUM 0.15 MG/1
150 TABLET ORAL DAILY
COMMUNITY
Start: 2021-10-18

## 2021-11-10 RX ORDER — FERROUS SULFATE 325(65) MG
1 TABLET ORAL 2 TIMES DAILY
COMMUNITY
Start: 2021-10-18

## 2021-11-10 RX ORDER — FUROSEMIDE 40 MG/1
40 TABLET ORAL DAILY
COMMUNITY
Start: 2021-08-24

## 2021-11-10 RX ORDER — FLUTICASONE PROPIONATE 50 MCG
1 SPRAY, SUSPENSION (ML) NASAL DAILY
COMMUNITY
Start: 2021-10-18 | End: 2021-11-10 | Stop reason: SDUPTHER

## 2021-11-10 RX ORDER — PANTOPRAZOLE SODIUM 40 MG/1
40 TABLET, DELAYED RELEASE ORAL DAILY
COMMUNITY
Start: 2021-10-18

## 2021-11-10 RX ORDER — GUAIFENESIN 600 MG/1
600 TABLET, EXTENDED RELEASE ORAL
COMMUNITY
Start: 2021-09-15 | End: 2021-11-15

## 2021-11-10 RX ORDER — BUDESONIDE 0.5 MG/2ML
0.5 INHALANT ORAL
Qty: 120 ML | Refills: 12 | Status: SHIPPED | OUTPATIENT
Start: 2021-11-10

## 2021-11-10 RX ORDER — DM/PE/ACETAMINOPHEN/CHLORPHENR 10-5-325-2
1 TABLET, SEQUENTIAL ORAL DAILY
COMMUNITY
Start: 2021-10-18

## 2021-11-10 RX ORDER — MEMANTINE HYDROCHLORIDE 28 MG/1
28 CAPSULE, EXTENDED RELEASE ORAL DAILY
COMMUNITY
Start: 2021-10-18

## 2021-11-10 RX ORDER — LOSARTAN POTASSIUM 25 MG/1
25 TABLET ORAL DAILY
COMMUNITY
Start: 2021-10-18

## 2021-11-10 RX ORDER — CYANOCOBALAMIN 1000 UG/ML
1000 INJECTION, SOLUTION INTRAMUSCULAR; SUBCUTANEOUS
COMMUNITY
Start: 2021-10-18

## 2021-11-10 RX ORDER — BIMATOPROST 0.01 %
1 DROPS OPHTHALMIC (EYE) NIGHTLY
COMMUNITY
Start: 2021-11-08

## 2021-11-10 RX ORDER — FLUTICASONE PROPIONATE 50 MCG
1 SPRAY, SUSPENSION (ML) NASAL DAILY
Qty: 16 G | Refills: 12 | Status: SHIPPED | OUTPATIENT
Start: 2021-11-10

## 2021-11-10 RX ORDER — GUAIFENESIN 600 MG/1
600 TABLET, EXTENDED RELEASE ORAL 2 TIMES DAILY
COMMUNITY
Start: 2021-09-16

## 2021-11-10 RX ORDER — SERTRALINE HYDROCHLORIDE 100 MG/1
200 TABLET, FILM COATED ORAL DAILY
COMMUNITY
Start: 2021-10-18

## 2021-11-10 NOTE — PROGRESS NOTES
Primary Care Provider  Karol Peace MD     Referring Provider  No ref. provider found     Chief Complaint  Med Refill (F/u for refills on nebulizer medications ) and Cough (not productive )    Subjective          Latha Mcmullen presents to [facility]  History of Present Illness  Latha Mcmullen is a 71 y.o. female patient of Dr. Montero with a history of chronic hypoxic respiratory failure, recurrent pneumonia, dysphagia, and a developmental delay.  She is here for a follow-up visit today.    Patient, caregiver, and guardian are on room today.  Patient's caregiver is providing information.  They state the patient is doing well since her last visit.  She denies having any antibiotics or steroids for her lungs.  She continues to use Brovana and Pulmicort as prescribed.  She is in need of refills, along with Flonase.  They state that her breathing is at baseline.  She does not complain of any shortness of breath.  She does have a nonproductive cough.  She continues to wear 2 L of oxygen at night.  She has not received a flu, pneumonia, or Covid vaccine.  She is able to form her ADLs with help from her caregiver.  Patient's caregiver and guardian have no other concerns at this time.       Her history of smoking is      Tobacco Use: Low Risk    • Smoking Tobacco Use: Never Smoker   • Smokeless Tobacco Use: Never Used   .    Review of Systems   Constitutional: Negative for chills, fatigue, fever, unexpected weight gain and unexpected weight loss.   HENT: Negative for congestion (Nasal), hearing loss, mouth sores, nosebleeds, postnasal drip, sore throat and trouble swallowing.    Eyes: Negative for blurred vision and visual disturbance.   Respiratory: Positive for cough and shortness of breath. Negative for apnea and wheezing.         Negative for Hemoptysis     Cardiovascular: Negative for chest pain, palpitations and leg swelling.   Gastrointestinal: Negative for  abdominal pain, constipation, diarrhea, nausea, vomiting and GERD.        Negative for Jaundice  Negative for Bloating  Negative for Melena   Musculoskeletal: Negative for joint swelling and myalgias.        Negative for Joint pain  Negative for Joint stiffness   Skin: Negative for color change.        Negative for cyanosis   Neurological: Negative for syncope, weakness, numbness and headache.      Sleep: Negative for Excessive daytime sleepiness  Negative for morning headaches  Negative for Snoring    Family History   Problem Relation Age of Onset   • Heart disease Mother    • Diabetes Father    • Diabetes Sister    • Cancer Sister    • Diabetes Brother    • Cancer Brother         Social History     Socioeconomic History   • Marital status: Single   Tobacco Use   • Smoking status: Never Smoker   • Smokeless tobacco: Never Used   Vaping Use   • Vaping Use: Never used   Substance and Sexual Activity   • Alcohol use: Not Currently   • Drug use: Never   • Sexual activity: Defer        Past Medical History:   Diagnosis Date   • Anemia    • Anxiety    • Anxiety    • Arthritis    • Depression    • GERD (gastroesophageal reflux disease)    • Hypertension    • Kidney disease     OR BLADDER DISEASE   • Reflux esophagitis    • Seasonal allergies    • Thyroid disease         Immunization History   Administered Date(s) Administered   • Influenza, Unspecified 10/01/2008         Allergies   Allergen Reactions   • Buspirone Unknown - Low Severity   • Celexa [Citalopram] Unknown - Low Severity   • Geodon [Ziprasidone Hcl] Unknown - Low Severity   • Haemophilus Influenzae Vaccines Swelling   • Mercurial Derivatives Unknown - Low Severity   • Nsaids Unknown - Low Severity   • Olanzapine Unknown - Low Severity   • Serotonin Reuptake Inhibitors (Ssris) Unknown - Low Severity   • Sympathomimetics Unknown - Low Severity   • Ziprasidone Unknown - Low Severity          Current Outpatient Medications:   •  amLODIPine (NORVASC) 10 MG tablet,  Take 10 mg by mouth Daily., Disp: , Rfl:   •  arformoterol (BROVANA) 15 MCG/2ML nebulizer solution, Take 2 mL by nebulization 2 (Two) Times a Day., Disp: 120 mL, Rfl: 12  •  budesonide (PULMICORT) 0.5 MG/2ML nebulizer solution, Take 2 mL by nebulization 2 (Two) Times a Day., Disp: 120 mL, Rfl: 12  •  cyanocobalamin 1000 MCG/ML injection, INJECT ONE vial INTRAMUSCULARLY ONCE a MONTH, Disp: , Rfl:   •  docusate sodium (COLACE) 100 MG capsule, Take 100 mg by mouth 2 (Two) Times a Day., Disp: , Rfl:   •  donepezil (ARICEPT) 10 MG tablet, TAKE ONE TABLET BY MOUTH EACH NIGHT AT BEDTIME, Disp: , Rfl:   •  FeroSul 325 (65 Fe) MG tablet, Take 1 tablet by mouth 2 (Two) Times a Day., Disp: , Rfl:   •  fluticasone (FLONASE) 50 MCG/ACT nasal spray, 1 spray by Each Nare route Daily., Disp: 16 g, Rfl: 12  •  furosemide (LASIX) 40 MG tablet, Take 40 mg by mouth Daily., Disp: , Rfl:   •  GNP Mucus  MG 12 hr tablet, TAKE ONE TABLET BY MOUTH twice daily as needed FOR cough (DO not crush, chew, OR split), Disp: , Rfl:   •  GNP Vitamin D Super Strength 125 MCG (5000 UT) tablet, Take 1 tablet by mouth Daily., Disp: , Rfl:   •  hydrALAZINE (APRESOLINE) 50 MG tablet, Take 50 mg by mouth 3 (Three) Times a Day., Disp: , Rfl:   •  lamoTRIgine (LaMICtal) 25 MG tablet, Take 75 mg by mouth 2 (Two) Times a Day., Disp: , Rfl:   •  levothyroxine (SYNTHROID, LEVOTHROID) 150 MCG tablet, Take 150 mcg by mouth Daily., Disp: , Rfl:   •  losartan (COZAAR) 25 MG tablet, Take 25 mg by mouth Daily., Disp: , Rfl:   •  Lumigan 0.01 % ophthalmic drops, , Disp: , Rfl:   •  memantine (NAMENDA XR) 28 MG capsule sustained-release 24 hr extended release capsule, Take 28 mg by mouth Daily., Disp: , Rfl:   •  metoprolol tartrate (LOPRESSOR) 25 MG tablet, Take 12.5 mg by mouth 2 (Two) Times a Day., Disp: , Rfl:   •  O2 (OXYGEN), Inhale 2 L/min Every Night., Disp: , Rfl:   •  pantoprazole (PROTONIX) 40 MG EC tablet, Take 40 mg by mouth Daily., Disp: , Rfl:   •   sertraline (ZOLOFT) 100 MG tablet, Take 200 mg by mouth Daily., Disp: , Rfl:   •  guaiFENesin (MUCINEX) 600 MG 12 hr tablet, Take 600 mg by mouth., Disp: , Rfl:      Objective   Physical Exam  Constitutional:       General: She is not in acute distress.     Appearance: Normal appearance. She is overweight.   HENT:      Right Ear: Hearing normal.      Left Ear: Hearing normal.      Nose: No nasal tenderness or congestion.      Mouth/Throat:      Mouth: Mucous membranes are moist. No oral lesions.   Eyes:      Extraocular Movements: Extraocular movements intact.      Pupils: Pupils are equal, round, and reactive to light.   Neck:      Thyroid: No thyroid mass or thyromegaly.   Cardiovascular:      Rate and Rhythm: Normal rate and regular rhythm.      Pulses: Normal pulses.      Heart sounds: Normal heart sounds. No murmur heard.      Pulmonary:      Effort: Pulmonary effort is normal.      Breath sounds: Normal breath sounds. No wheezing, rhonchi or rales.   Chest:   Breasts:      Right: No axillary adenopathy.       Abdominal:      General: Bowel sounds are normal. There is no distension.      Palpations: Abdomen is soft.      Tenderness: There is no abdominal tenderness.   Musculoskeletal:      Cervical back: Neck supple.      Right lower leg: No edema.      Left lower leg: No edema.   Lymphadenopathy:      Cervical: No cervical adenopathy.      Upper Body:      Right upper body: No axillary adenopathy.   Skin:     General: Skin is warm and dry.      Findings: No lesion or rash.   Neurological:      General: No focal deficit present.      Mental Status: She is alert and oriented to person, place, and time.      Cranial Nerves: Cranial nerves are intact.   Psychiatric:         Mood and Affect: Affect normal. Mood is not anxious or depressed.         Vital Signs:   /52 (BP Location: Left arm, Patient Position: Sitting, Cuff Size: Adult)   Pulse 55   Temp 97.8 °F (36.6 °C) (Tympanic)   Resp 16   Ht 162.6 cm  "(64\")   Wt 95.7 kg (211 lb)   SpO2 94% Comment: room air  BMI 36.22 kg/m²        Result Review :{Labs  Result Review  Imaging  Med Tab  Media  Procedures :23}       Data reviewed: Dr. Ang last office note   Procedures        Assessment and Plan    Diagnoses and all orders for this visit:    1. Chronic respiratory failure with hypoxia (HCC) (Primary)  -     fluticasone (FLONASE) 50 MCG/ACT nasal spray; 1 spray by Each Nare route Daily.  Dispense: 16 g; Refill: 12  -     budesonide (PULMICORT) 0.5 MG/2ML nebulizer solution; Take 2 mL by nebulization 2 (Two) Times a Day.  Dispense: 120 mL; Refill: 12  -     arformoterol (BROVANA) 15 MCG/2ML nebulizer solution; Take 2 mL by nebulization 2 (Two) Times a Day.  Dispense: 120 mL; Refill: 12  -     Oxygen Therapy    2. Recurrent pneumonia  -     fluticasone (FLONASE) 50 MCG/ACT nasal spray; 1 spray by Each Nare route Daily.  Dispense: 16 g; Refill: 12  -     budesonide (PULMICORT) 0.5 MG/2ML nebulizer solution; Take 2 mL by nebulization 2 (Two) Times a Day.  Dispense: 120 mL; Refill: 12  -     arformoterol (BROVANA) 15 MCG/2ML nebulizer solution; Take 2 mL by nebulization 2 (Two) Times a Day.  Dispense: 120 mL; Refill: 12    3. Developmental delay    4. Dysphagia, unspecified type    5. Cough    6. Nocturnal hypoxia  -     Oxygen Therapy    7.  Continue Brovana and Pulmicort as prescribed.  Rinse mouth after each use.  8.  Continue Flonase.  9.  Continue supplemental oxygen to maintain saturations at or above 89%.  10.  Follow-up in 1 year, sooner if needed.      Follow Up   Return in about 1 year (around 11/10/2022) for Recheck with Lakshmi.  Patient was given instructions and counseling regarding her condition or for health maintenance advice. Please see specific information pulled into the AVS if appropriate.       "

## 2022-01-07 ENCOUNTER — APPOINTMENT (OUTPATIENT)
Dept: CT IMAGING | Facility: HOSPITAL | Age: 72
End: 2022-01-07

## 2022-01-07 ENCOUNTER — APPOINTMENT (OUTPATIENT)
Dept: GENERAL RADIOLOGY | Facility: HOSPITAL | Age: 72
End: 2022-01-07

## 2022-01-07 ENCOUNTER — APPOINTMENT (OUTPATIENT)
Dept: MRI IMAGING | Facility: HOSPITAL | Age: 72
End: 2022-01-07

## 2022-01-07 ENCOUNTER — HOSPITAL ENCOUNTER (EMERGENCY)
Facility: HOSPITAL | Age: 72
Discharge: ANOTHER HOSPITAL | End: 2022-01-08
Attending: EMERGENCY MEDICINE | Admitting: EMERGENCY MEDICINE

## 2022-01-07 DIAGNOSIS — R41.82 ALTERED MENTAL STATUS, UNSPECIFIED ALTERED MENTAL STATUS TYPE: Primary | ICD-10-CM

## 2022-01-07 LAB
ALBUMIN SERPL-MCNC: 3.4 G/DL (ref 3.5–5.2)
ALBUMIN/GLOB SERPL: 0.9 G/DL
ALP SERPL-CCNC: 68 U/L (ref 39–117)
ALT SERPL W P-5'-P-CCNC: 13 U/L (ref 1–33)
ANION GAP SERPL CALCULATED.3IONS-SCNC: 11.4 MMOL/L (ref 5–15)
AST SERPL-CCNC: 18 U/L (ref 1–32)
BACTERIA UR QL AUTO: ABNORMAL /HPF
BASOPHILS # BLD AUTO: 0.04 10*3/MM3 (ref 0–0.2)
BASOPHILS NFR BLD AUTO: 0.4 % (ref 0–1.5)
BILIRUB SERPL-MCNC: 0.2 MG/DL (ref 0–1.2)
BILIRUB UR QL STRIP: NEGATIVE
BUN SERPL-MCNC: 18 MG/DL (ref 8–23)
BUN/CREAT SERPL: 14.9 (ref 7–25)
CALCIUM SPEC-SCNC: 9 MG/DL (ref 8.6–10.5)
CHLORIDE SERPL-SCNC: 105 MMOL/L (ref 98–107)
CLARITY UR: CLEAR
CO2 SERPL-SCNC: 24.6 MMOL/L (ref 22–29)
COLOR UR: YELLOW
CREAT SERPL-MCNC: 1.21 MG/DL (ref 0.57–1)
DEPRECATED RDW RBC AUTO: 48.5 FL (ref 37–54)
EOSINOPHIL # BLD AUTO: 0.13 10*3/MM3 (ref 0–0.4)
EOSINOPHIL NFR BLD AUTO: 1.3 % (ref 0.3–6.2)
ERYTHROCYTE [DISTWIDTH] IN BLOOD BY AUTOMATED COUNT: 15.7 % (ref 12.3–15.4)
GFR SERPL CREATININE-BSD FRML MDRD: 44 ML/MIN/1.73
GLOBULIN UR ELPH-MCNC: 3.6 GM/DL
GLUCOSE SERPL-MCNC: 113 MG/DL (ref 65–99)
GLUCOSE UR STRIP-MCNC: NEGATIVE MG/DL
HCT VFR BLD AUTO: 27.8 % (ref 34–46.6)
HGB BLD-MCNC: 8.5 G/DL (ref 12–15.9)
HGB UR QL STRIP.AUTO: NEGATIVE
HOLD SPECIMEN: NORMAL
HOLD SPECIMEN: NORMAL
HYALINE CASTS UR QL AUTO: ABNORMAL /LPF
IMM GRANULOCYTES # BLD AUTO: 0.05 10*3/MM3 (ref 0–0.05)
IMM GRANULOCYTES NFR BLD AUTO: 0.5 % (ref 0–0.5)
KETONES UR QL STRIP: NEGATIVE
LEUKOCYTE ESTERASE UR QL STRIP.AUTO: NEGATIVE
LYMPHOCYTES # BLD AUTO: 1.3 10*3/MM3 (ref 0.7–3.1)
LYMPHOCYTES NFR BLD AUTO: 12.9 % (ref 19.6–45.3)
MAGNESIUM SERPL-MCNC: 2.3 MG/DL (ref 1.6–2.4)
MCH RBC QN AUTO: 26.2 PG (ref 26.6–33)
MCHC RBC AUTO-ENTMCNC: 30.6 G/DL (ref 31.5–35.7)
MCV RBC AUTO: 85.5 FL (ref 79–97)
MONOCYTES # BLD AUTO: 0.88 10*3/MM3 (ref 0.1–0.9)
MONOCYTES NFR BLD AUTO: 8.7 % (ref 5–12)
NEUTROPHILS NFR BLD AUTO: 7.69 10*3/MM3 (ref 1.7–7)
NEUTROPHILS NFR BLD AUTO: 76.2 % (ref 42.7–76)
NITRITE UR QL STRIP: NEGATIVE
NRBC BLD AUTO-RTO: 0 /100 WBC (ref 0–0.2)
PH UR STRIP.AUTO: 5.5 [PH] (ref 5–8)
PLATELET # BLD AUTO: 305 10*3/MM3 (ref 140–450)
PMV BLD AUTO: 9 FL (ref 6–12)
POTASSIUM SERPL-SCNC: 4.3 MMOL/L (ref 3.5–5.2)
PROT SERPL-MCNC: 7 G/DL (ref 6–8.5)
PROT UR QL STRIP: ABNORMAL
RBC # BLD AUTO: 3.25 10*6/MM3 (ref 3.77–5.28)
RBC # UR STRIP: ABNORMAL /HPF
REF LAB TEST METHOD: ABNORMAL
SODIUM SERPL-SCNC: 141 MMOL/L (ref 136–145)
SP GR UR STRIP: 1.02 (ref 1–1.03)
SQUAMOUS #/AREA URNS HPF: ABNORMAL /HPF
TROPONIN T SERPL-MCNC: <0.01 NG/ML (ref 0–0.03)
UROBILINOGEN UR QL STRIP: ABNORMAL
WBC # UR STRIP: ABNORMAL /HPF
WBC NRBC COR # BLD: 10.09 10*3/MM3 (ref 3.4–10.8)
WHOLE BLOOD HOLD SPECIMEN: NORMAL
WHOLE BLOOD HOLD SPECIMEN: NORMAL

## 2022-01-07 PROCEDURE — 36415 COLL VENOUS BLD VENIPUNCTURE: CPT

## 2022-01-07 PROCEDURE — U0005 INFEC AGEN DETEC AMPLI PROBE: HCPCS | Performed by: EMERGENCY MEDICINE

## 2022-01-07 PROCEDURE — 80307 DRUG TEST PRSMV CHEM ANLYZR: CPT | Performed by: EMERGENCY MEDICINE

## 2022-01-07 PROCEDURE — 96376 TX/PRO/DX INJ SAME DRUG ADON: CPT

## 2022-01-07 PROCEDURE — 70551 MRI BRAIN STEM W/O DYE: CPT

## 2022-01-07 PROCEDURE — 96374 THER/PROPH/DIAG INJ IV PUSH: CPT

## 2022-01-07 PROCEDURE — 83735 ASSAY OF MAGNESIUM: CPT | Performed by: EMERGENCY MEDICINE

## 2022-01-07 PROCEDURE — 81001 URINALYSIS AUTO W/SCOPE: CPT | Performed by: EMERGENCY MEDICINE

## 2022-01-07 PROCEDURE — 93010 ELECTROCARDIOGRAM REPORT: CPT | Performed by: INTERNAL MEDICINE

## 2022-01-07 PROCEDURE — 82077 ASSAY SPEC XCP UR&BREATH IA: CPT | Performed by: EMERGENCY MEDICINE

## 2022-01-07 PROCEDURE — 99284 EMERGENCY DEPT VISIT MOD MDM: CPT

## 2022-01-07 PROCEDURE — U0004 COV-19 TEST NON-CDC HGH THRU: HCPCS | Performed by: EMERGENCY MEDICINE

## 2022-01-07 PROCEDURE — 84484 ASSAY OF TROPONIN QUANT: CPT | Performed by: EMERGENCY MEDICINE

## 2022-01-07 PROCEDURE — 93005 ELECTROCARDIOGRAM TRACING: CPT | Performed by: EMERGENCY MEDICINE

## 2022-01-07 PROCEDURE — 70450 CT HEAD/BRAIN W/O DYE: CPT

## 2022-01-07 PROCEDURE — 25010000002 LORAZEPAM PER 2 MG: Performed by: EMERGENCY MEDICINE

## 2022-01-07 PROCEDURE — 85025 COMPLETE CBC W/AUTO DIFF WBC: CPT | Performed by: EMERGENCY MEDICINE

## 2022-01-07 PROCEDURE — 93005 ELECTROCARDIOGRAM TRACING: CPT

## 2022-01-07 PROCEDURE — 80053 COMPREHEN METABOLIC PANEL: CPT | Performed by: EMERGENCY MEDICINE

## 2022-01-07 PROCEDURE — 71045 X-RAY EXAM CHEST 1 VIEW: CPT

## 2022-01-07 RX ORDER — LORAZEPAM 2 MG/ML
1 INJECTION INTRAMUSCULAR ONCE
Status: COMPLETED | OUTPATIENT
Start: 2022-01-07 | End: 2022-01-07

## 2022-01-07 RX ORDER — ASPIRIN 81 MG/1
324 TABLET, CHEWABLE ORAL ONCE
Status: COMPLETED | OUTPATIENT
Start: 2022-01-07 | End: 2022-01-07

## 2022-01-07 RX ORDER — SODIUM CHLORIDE 0.9 % (FLUSH) 0.9 %
10 SYRINGE (ML) INJECTION AS NEEDED
Status: DISCONTINUED | OUTPATIENT
Start: 2022-01-07 | End: 2022-01-08 | Stop reason: HOSPADM

## 2022-01-07 RX ADMIN — LORAZEPAM 1 MG: 2 INJECTION INTRAMUSCULAR; INTRAVENOUS at 20:41

## 2022-01-07 RX ADMIN — LORAZEPAM 1 MG: 2 INJECTION INTRAMUSCULAR; INTRAVENOUS at 21:52

## 2022-01-07 RX ADMIN — ASPIRIN 81 MG CHEWABLE TABLET 324 MG: 81 TABLET CHEWABLE at 20:18

## 2022-01-07 NOTE — ED PROVIDER NOTES
"Time: 6:30 PM EST  Arrived by: private car  Chief Complaint: Not sleeping, aggressive behavior, altered mental status  History provided by: Sister (POA)  History is limited by: Altered mental status     History of Present Illness:  Patient is a 71 y.o. year old female that presents to the emergency department with increasing altered mental status for the past 3 weeks.  Sister reports the patient was admitted to [facility] from 12 21-12 25.  She was admitted for pneumonia and UTI.  Sister states that patient was likely on steroids while inpatient and that this does have a tendency to cause up to 2 weeks of increasing altered mental status in the past for her.  This normally would have stopped by now, but has not.  She states the patient is not sleeping very much at all, daytime or nighttime.  She says she is yelling and screaming out more frequently, \"flaring back to hit\", wetting the bed and having bowel movements in the shower.  All this is unusual for her.  No help with either hydroxyzine or trazodone.  Saw PMD 2 days ago to repeat UA and sister states she did not have a UTI at that time.    HPI    Similar Symptoms Previously: Yes  Recently seen: Yes, PMD placed patient on hydroxyzine to attempt to help her sleep.  Psychiatry then placed patient on trazodone.        Patient Care Team  Primary Care Provider: Karol Peace MD    Past Medical History:     Allergies   Allergen Reactions   • Buspirone Unknown - Low Severity   • Celexa [Citalopram] Unknown - Low Severity   • Geodon [Ziprasidone Hcl] Unknown - Low Severity   • Haemophilus Influenzae Vaccines Swelling   • Mercurial Derivatives Unknown - Low Severity   • Nsaids Unknown - Low Severity   • Olanzapine Unknown - Low Severity   • Serotonin Reuptake Inhibitors (Ssris) Unknown - Low Severity   • Sympathomimetics Unknown - Low Severity   • Ziprasidone Unknown - Low Severity     Past Medical History:   Diagnosis Date   • Anemia    • Anxiety    • " Anxiety    • Arthritis    • Dementia (HCC)    • Depression    • GERD (gastroesophageal reflux disease)    • Hypertension    • Kidney disease     OR BLADDER DISEASE   • Reflux esophagitis    • Seasonal allergies    • Thyroid disease      Past Surgical History:   Procedure Laterality Date   • HYSTERECTOMY      PARTIAL     Family History   Problem Relation Age of Onset   • Heart disease Mother    • Diabetes Father    • Diabetes Sister    • Cancer Sister    • Diabetes Brother    • Cancer Brother        Home Medications:  Prior to Admission medications    Medication Sig Start Date End Date Taking? Authorizing Provider   amLODIPine (NORVASC) 10 MG tablet Take 10 mg by mouth Daily. 10/18/21   Melania Martinez MD   arformoterol (BROVANA) 15 MCG/2ML nebulizer solution Take 2 mL by nebulization 2 (Two) Times a Day. 11/10/21   Lakshmi Alston APRN   budesonide (PULMICORT) 0.5 MG/2ML nebulizer solution Take 2 mL by nebulization 2 (Two) Times a Day. 11/10/21   Lakshmi Alston APRN   cyanocobalamin 1000 MCG/ML injection INJECT ONE vial INTRAMUSCULARLY ONCE a MONTH 10/18/21   Melania Martinez MD   docusate sodium (COLACE) 100 MG capsule Take 100 mg by mouth 2 (Two) Times a Day. 10/18/21   Melania Martinez MD   donepezil (ARICEPT) 10 MG tablet TAKE ONE TABLET BY MOUTH EACH NIGHT AT BEDTIME 10/18/21   Melania Martinez MD   FeroSul 325 (65 Fe) MG tablet Take 1 tablet by mouth 2 (Two) Times a Day. 10/18/21   Melania Martinez MD   fluticasone (FLONASE) 50 MCG/ACT nasal spray 1 spray by Each Nare route Daily. 11/10/21   Lakshim Alston APRN   furosemide (LASIX) 40 MG tablet Take 40 mg by mouth Daily. 8/24/21   Provider, Historical, MD   GNP Mucus  MG 12 hr tablet TAKE ONE TABLET BY MOUTH twice daily as needed FOR cough (DO not crush, chew, OR split) 9/16/21   Provider, Historical, MD   GNP Vitamin D Super Strength 125 MCG (5000 UT) tablet Take 1 tablet by mouth Daily. 10/18/21   Juan  MD Melania   hydrALAZINE (APRESOLINE) 50 MG tablet Take 50 mg by mouth 3 (Three) Times a Day. 10/18/21   Melania Martinez MD   lamoTRIgine (LaMICtal) 25 MG tablet Take 75 mg by mouth 2 (Two) Times a Day. 10/18/21   Melania Martinez MD   levothyroxine (SYNTHROID, LEVOTHROID) 150 MCG tablet Take 150 mcg by mouth Daily. 10/18/21   Melania Martinez MD   losartan (COZAAR) 25 MG tablet Take 25 mg by mouth Daily. 10/18/21   Melania Martinez MD   Lumigan 0.01 % ophthalmic drops  11/8/21   Melania Martinez MD   memantine (NAMENDA XR) 28 MG capsule sustained-release 24 hr extended release capsule Take 28 mg by mouth Daily. 10/18/21   Melania Martinez MD   metoprolol tartrate (LOPRESSOR) 25 MG tablet Take 12.5 mg by mouth 2 (Two) Times a Day. 10/18/21   Melania Martinez MD   O2 (OXYGEN) Inhale 2 L/min Every Night.    Melania Martinez MD   pantoprazole (PROTONIX) 40 MG EC tablet Take 40 mg by mouth Daily. 10/18/21   Melania Martinez MD   sertraline (ZOLOFT) 100 MG tablet Take 200 mg by mouth Daily. 10/18/21   Melania Martinez MD        Social History:   Social History     Tobacco Use   • Smoking status: Never Smoker   • Smokeless tobacco: Never Used   Vaping Use   • Vaping Use: Never used   Substance Use Topics   • Alcohol use: Not Currently   • Drug use: Never     Recent travel: not applicable     Review of Systems:  Review of Systems   Constitutional: Negative for chills and fever.   HENT: Negative for congestion, rhinorrhea and sore throat.    Eyes: Negative for pain and visual disturbance.   Respiratory: Positive for cough (Improving since discharge 12/25).    Cardiovascular: Negative for chest pain and palpitations.   Gastrointestinal: Negative for abdominal pain, diarrhea, nausea and vomiting.   Genitourinary: Negative for difficulty urinating and dysuria.   Musculoskeletal: Negative for joint swelling and myalgias.   Skin: Negative for color change.   Neurological:  "Negative for seizures and headaches.   Psychiatric/Behavioral: Negative.    All other systems reviewed and are negative.       Physical Exam:  /74   Pulse 75   Temp 98.9 °F (37.2 °C) (Oral)   Resp 18   Ht 157.5 cm (62\")   Wt 92.4 kg (203 lb 11.3 oz)   SpO2 94%   BMI 37.26 kg/m²     Physical Exam  Vitals and nursing note reviewed.   Constitutional:       Appearance: Normal appearance.   HENT:      Head: Normocephalic and atraumatic.      Nose: Nose normal.      Mouth/Throat:      Mouth: Mucous membranes are dry.   Eyes:      Extraocular Movements: Extraocular movements intact.      Pupils: Pupils are equal, round, and reactive to light.   Cardiovascular:      Rate and Rhythm: Normal rate and regular rhythm.      Heart sounds: Normal heart sounds.   Pulmonary:      Effort: Pulmonary effort is normal.      Breath sounds: Normal breath sounds.   Abdominal:      General: Bowel sounds are normal.      Palpations: Abdomen is soft.      Tenderness: There is no abdominal tenderness.   Musculoskeletal:         General: No swelling. Normal range of motion.      Cervical back: Normal range of motion and neck supple.   Skin:     General: Skin is warm and dry.      Coloration: Skin is not jaundiced.   Neurological:      General: No focal deficit present.      Mental Status: She is alert. Mental status is at baseline.      Motor: No weakness.      Comments: Abnormal facies and speech consistent with diagnosis developmental delay.  Full/equal motor strength.  Remainder of neurologic exam somewhat limited by patient's developmental delay/cognitive limitation   Psychiatric:         Mood and Affect: Mood normal.         Behavior: Behavior normal.         Judgment: Judgment normal.                Medications in the Emergency Department:  Medications   aspirin chewable tablet 324 mg (324 mg Oral Given 1/7/22 2018)   LORazepam (ATIVAN) injection 1 mg (1 mg Intravenous Given 1/7/22 2041)   LORazepam (ATIVAN) injection 1 mg (1 " mg Intravenous Given 1/7/22 2152)   amLODIPine (NORVASC) tablet 10 mg (10 mg Oral Given 1/8/22 0656)   furosemide (LASIX) tablet 40 mg (40 mg Oral Given 1/8/22 1052)   hydrALAZINE (APRESOLINE) tablet 50 mg (50 mg Oral Given 1/8/22 1100)   lamoTRIgine (LaMICtal) tablet 75 mg (75 mg Oral Given 1/8/22 1051)   levothyroxine (SYNTHROID, LEVOTHROID) tablet 150 mcg (150 mcg Oral Given 1/8/22 1052)   losartan (COZAAR) tablet 25 mg (25 mg Oral Given 1/8/22 1052)   sertraline (ZOLOFT) tablet 200 mg (200 mg Oral Given 1/8/22 1052)        Labs  Lab Results (last 24 hours)     ** No results found for the last 24 hours. **           Imaging:  No Radiology Exams Resulted Within Past 24 Hours    Procedures:  Procedures    Progress  ED Course as of 01/09/22 0241 Fri Jan 07, 2022 1937 Case discussed with Dr. Dumas.  He states he can see patient in the morning and get an MRI to rule out stroke. [RP]   2002 Eosinophils Absolute: 0.13 [RP]      ED Course User Index  [RP] Freddie Herbert MD                            Medical Decision Making:  MDM  Number of Diagnoses or Management Options     Amount and/or Complexity of Data Reviewed  Clinical lab tests: reviewed  Tests in the radiology section of CPT®: reviewed  Tests in the medicine section of CPT®: reviewed         Final diagnoses:   Altered mental status, unspecified altered mental status type        Disposition:  ED Disposition     ED Disposition Condition Comment    Transfer to Another Facility             This medical record created using voice recognition software and may contain unintended errors.         Freddie Herbert MD  01/09/22 0241

## 2022-01-07 NOTE — ED NOTES
Pt arrives with sister-guardian, c/o AMS. Recently dx with UTI and pneumonia on 12/21 and discharged on 12/25 with abx. Also reports that her sister has not been able to sleep even with new prescription from PCP. Stated that pt has been altered since discharge from the hospital, no changes or improvement since, concerned about another possible UTI. Stated that she is more violent at home, urinating in bed, and defecating in the shower which is not her normal behavior per sister. Reports hx Dementia.      Randi López, RN  01/07/22 1808       Randi López RN  01/07/22 1807

## 2022-01-08 VITALS
SYSTOLIC BLOOD PRESSURE: 141 MMHG | BODY MASS INDEX: 37.49 KG/M2 | DIASTOLIC BLOOD PRESSURE: 74 MMHG | WEIGHT: 203.71 LBS | HEIGHT: 62 IN | RESPIRATION RATE: 18 BRPM | TEMPERATURE: 98.9 F | HEART RATE: 75 BPM | OXYGEN SATURATION: 94 %

## 2022-01-08 LAB
AMPHET+METHAMPHET UR QL: NEGATIVE
BARBITURATES UR QL SCN: NEGATIVE
BENZODIAZ UR QL SCN: NEGATIVE
CANNABINOIDS SERPL QL: NEGATIVE
COCAINE UR QL: NEGATIVE
ETHANOL BLD-MCNC: <10 MG/DL (ref 0–10)
ETHANOL UR QL: <0.01 %
METHADONE UR QL SCN: NEGATIVE
OPIATES UR QL: NEGATIVE
OXYCODONE UR QL SCN: NEGATIVE
QT INTERVAL: 431 MS
SARS-COV-2 RNA RESP QL NAA+PROBE: NOT DETECTED

## 2022-01-08 RX ORDER — LOSARTAN POTASSIUM 25 MG/1
25 TABLET ORAL ONCE
Status: COMPLETED | OUTPATIENT
Start: 2022-01-08 | End: 2022-01-08

## 2022-01-08 RX ORDER — FUROSEMIDE 40 MG/1
40 TABLET ORAL ONCE
Status: COMPLETED | OUTPATIENT
Start: 2022-01-08 | End: 2022-01-08

## 2022-01-08 RX ORDER — HYDRALAZINE HYDROCHLORIDE 25 MG/1
50 TABLET, FILM COATED ORAL ONCE
Status: COMPLETED | OUTPATIENT
Start: 2022-01-08 | End: 2022-01-08

## 2022-01-08 RX ORDER — SERTRALINE HYDROCHLORIDE 100 MG/1
200 TABLET, FILM COATED ORAL ONCE
Status: COMPLETED | OUTPATIENT
Start: 2022-01-08 | End: 2022-01-08

## 2022-01-08 RX ORDER — AMLODIPINE BESYLATE 5 MG/1
10 TABLET ORAL ONCE
Status: COMPLETED | OUTPATIENT
Start: 2022-01-08 | End: 2022-01-08

## 2022-01-08 RX ORDER — LEVOTHYROXINE SODIUM 0.15 MG/1
150 TABLET ORAL ONCE
Status: COMPLETED | OUTPATIENT
Start: 2022-01-08 | End: 2022-01-08

## 2022-01-08 RX ORDER — LAMOTRIGINE 25 MG/1
75 TABLET ORAL ONCE
Status: COMPLETED | OUTPATIENT
Start: 2022-01-08 | End: 2022-01-08

## 2022-01-08 RX ORDER — PANTOPRAZOLE SODIUM 40 MG/1
40 TABLET, DELAYED RELEASE ORAL ONCE
Status: DISCONTINUED | OUTPATIENT
Start: 2022-01-08 | End: 2022-01-08 | Stop reason: HOSPADM

## 2022-01-08 RX ADMIN — LOSARTAN POTASSIUM 25 MG: 25 TABLET, FILM COATED ORAL at 10:52

## 2022-01-08 RX ADMIN — LAMOTRIGINE 75 MG: 25 TABLET ORAL at 10:51

## 2022-01-08 RX ADMIN — AMLODIPINE BESYLATE 10 MG: 5 TABLET ORAL at 06:56

## 2022-01-08 RX ADMIN — SERTRALINE HYDROCHLORIDE 200 MG: 100 TABLET ORAL at 10:52

## 2022-01-08 RX ADMIN — FUROSEMIDE 40 MG: 40 TABLET ORAL at 10:52

## 2022-01-08 RX ADMIN — LEVOTHYROXINE SODIUM 150 MCG: 0.15 TABLET ORAL at 10:52

## 2022-01-08 RX ADMIN — HYDRALAZINE HYDROCHLORIDE 50 MG: 25 TABLET, FILM COATED ORAL at 11:00

## 2022-01-08 NOTE — ED NOTES
PT PLACED ON 2L NC DUE TO FALLING ASLEEP AND O2 SATS DROPPING IN THE 80S. PT'S FAMILY MEMBER STATED THAT SHE REQUIRES 2L AT HOME WHEN SLEEPING.      Marc Mendez, RN  01/07/22 4799

## 2022-01-08 NOTE — ED NOTES
Patient awake requesting to go home. 1:1 safety watch continues.      Keisha Judd, ANJEL  01/08/22 0615